# Patient Record
Sex: MALE | Race: WHITE | Employment: OTHER | URBAN - METROPOLITAN AREA
[De-identification: names, ages, dates, MRNs, and addresses within clinical notes are randomized per-mention and may not be internally consistent; named-entity substitution may affect disease eponyms.]

---

## 2018-05-07 ENCOUNTER — OFFICE VISIT (OUTPATIENT)
Dept: OTOLARYNGOLOGY | Facility: CLINIC | Age: 63
End: 2018-05-07

## 2018-05-07 ENCOUNTER — OFFICE VISIT (OUTPATIENT)
Dept: AUDIOLOGY | Facility: CLINIC | Age: 63
End: 2018-05-07

## 2018-05-07 VITALS
DIASTOLIC BLOOD PRESSURE: 76 MMHG | HEIGHT: 77 IN | BODY MASS INDEX: 29.64 KG/M2 | TEMPERATURE: 99 F | SYSTOLIC BLOOD PRESSURE: 116 MMHG | WEIGHT: 251 LBS

## 2018-05-07 DIAGNOSIS — R42 DIZZINESS: Primary | ICD-10-CM

## 2018-05-07 PROCEDURE — 99243 OFF/OP CNSLTJ NEW/EST LOW 30: CPT | Performed by: OTOLARYNGOLOGY

## 2018-05-07 PROCEDURE — 92557 COMPREHENSIVE HEARING TEST: CPT | Performed by: AUDIOLOGIST

## 2018-05-07 PROCEDURE — 92567 TYMPANOMETRY: CPT | Performed by: AUDIOLOGIST

## 2018-05-07 PROCEDURE — 99212 OFFICE O/P EST SF 10 MIN: CPT | Performed by: OTOLARYNGOLOGY

## 2018-05-07 RX ORDER — GABAPENTIN 300 MG/1
300 CAPSULE ORAL 3 TIMES DAILY
COMMUNITY
End: 2018-11-01

## 2018-05-07 RX ORDER — DOXYCYCLINE 50 MG/1
TABLET ORAL
COMMUNITY
Start: 2018-05-03 | End: 2019-11-11

## 2018-05-07 RX ORDER — AMOXICILLIN 500 MG
CAPSULE ORAL
COMMUNITY

## 2018-05-07 NOTE — PROGRESS NOTES
AUDIOGRAM     Linell Lesch was referred for testing by Elmer Zepeda V due to dizziness. 10/2/1955  [id number]    Otoscopic Inspection:  Right ear:  No cerumen  Left ear:  No cerumen    Audiometric Test Results:   Audiometric thresholds indicated juan diego

## 2018-05-07 NOTE — PROGRESS NOTES
Nicolas Martinez is a 57year old male.  Patient presents with:  Dizziness: dizziness for 6 weeks  Sinus Problem: sinus congestion, sinus pressure, sinus headache, post nasal drip  for 6 weeks, 1 course of antibiotic and per pt it did not help    HPI:   He h REVIEW OF SYSTEMS:   GENERAL HEALTH: feels well otherwise  GENERAL : denies fever, chills, sweats, weight loss, weight gain  SKIN: denies any unusual skin lesions or rashes  RESPIRATORY: denies shortness of breath with exertion  NEURO: denies headaches

## 2018-05-10 ENCOUNTER — OFFICE VISIT (OUTPATIENT)
Dept: PHYSICAL THERAPY | Age: 63
End: 2018-05-10
Attending: FAMILY MEDICINE
Payer: COMMERCIAL

## 2018-05-10 DIAGNOSIS — R42 DIZZINESS: ICD-10-CM

## 2018-05-10 PROCEDURE — 97162 PT EVAL MOD COMPLEX 30 MIN: CPT | Performed by: PHYSICAL THERAPIST

## 2018-05-10 PROCEDURE — 95992 CANALITH REPOSITIONING PROC: CPT | Performed by: PHYSICAL THERAPIST

## 2018-05-10 NOTE — PROGRESS NOTES
PHYSICAL THERAPY EVALUATION:   Referring Physician: Dr. Nidia Mejía  Diagnosis: Dizziness (R42)      Date of Onset: March 2018 Date of Service: 5/10/2018  Visit # 1  Scheduled Visits 8  Insurance Authorized visits 6 PPO     PATIENT SUMMARY   Robert Carney static/dynamic balance, gait, and functional activities with challenges of head turns limiting community ambulation.   Pt. would benefit from skilled Physical Therapy to address the above impairments to get into/out of bed and walk and turn head without dif Canalith Repositioning Maneuver, Eye/head coordination exercises, Sensory organization training, Optokinetic stimulation, Strengthening, ROM, Exertion training; Gait Training; Manual Therapy;     Education or treatment limitation: None  Rehab Potential: go

## 2018-05-18 ENCOUNTER — OFFICE VISIT (OUTPATIENT)
Dept: PHYSICAL THERAPY | Age: 63
End: 2018-05-18
Attending: FAMILY MEDICINE
Payer: COMMERCIAL

## 2018-05-18 PROCEDURE — 95992 CANALITH REPOSITIONING PROC: CPT | Performed by: PHYSICAL THERAPIST

## 2018-05-18 NOTE — PROGRESS NOTES
Dx: Dizziness (R42)             Visit # 2  Fall Risk: standard     Scheduled Visits 8  Precautions: n/a   Insurance Authorized visits  6 PPO        Next MD visit: none scheduled  Evaluation Date: 5/10/18  Medication Changes since last visit?: No  Subjectiv

## 2018-05-24 ENCOUNTER — OFFICE VISIT (OUTPATIENT)
Dept: PHYSICAL THERAPY | Age: 63
End: 2018-05-24
Attending: FAMILY MEDICINE
Payer: COMMERCIAL

## 2018-05-24 PROCEDURE — 95992 CANALITH REPOSITIONING PROC: CPT | Performed by: PHYSICAL THERAPIST

## 2018-05-24 NOTE — PROGRESS NOTES
Dx: Dizziness (R42)             Visit # 3  Fall Risk: standard     Scheduled Visits 8  Precautions: n/a   Insurance Authorized visits  6 PPO        Next MD visit: none scheduled  Evaluation Date: 5/10/18  Medication Changes since last visit?: No  Subjectiv

## 2018-05-29 ENCOUNTER — OFFICE VISIT (OUTPATIENT)
Dept: PHYSICAL THERAPY | Age: 63
End: 2018-05-29
Attending: FAMILY MEDICINE
Payer: COMMERCIAL

## 2018-05-29 PROCEDURE — 95992 CANALITH REPOSITIONING PROC: CPT | Performed by: PHYSICAL THERAPIST

## 2018-05-29 NOTE — PROGRESS NOTES
Dx: Dizziness (R42)             Visit # 4  Fall Risk: standard     Scheduled Visits 8  Precautions: n/a   Insurance Authorized visits  6 PPO        Next MD visit: none scheduled  Evaluation Date: 5/10/18  Medication Changes since last visit?: No  Subjectiv

## 2018-06-05 ENCOUNTER — OFFICE VISIT (OUTPATIENT)
Dept: PHYSICAL THERAPY | Age: 63
End: 2018-06-05
Attending: OTOLARYNGOLOGY
Payer: COMMERCIAL

## 2018-06-05 PROCEDURE — 95992 CANALITH REPOSITIONING PROC: CPT | Performed by: PHYSICAL THERAPIST

## 2018-06-05 NOTE — PROGRESS NOTES
Dx: Dizziness (R42)             Visit # 5  Fall Risk: standard     Scheduled Visits 8  Precautions: n/a   Insurance Authorized visits  6 PPO        Next MD visit: none scheduled  Evaluation Date: 5/10/18  Medication Changes since last visit?: No  Subjectiv

## 2018-06-19 ENCOUNTER — OFFICE VISIT (OUTPATIENT)
Dept: PHYSICAL THERAPY | Age: 63
End: 2018-06-19
Attending: OTOLARYNGOLOGY
Payer: COMMERCIAL

## 2018-06-19 PROCEDURE — 97112 NEUROMUSCULAR REEDUCATION: CPT | Performed by: PHYSICAL THERAPIST

## 2018-06-19 NOTE — PROGRESS NOTES
Dx: Dizziness (R42)             Visit # 6  Fall Risk: standard     Scheduled Visits 8  Precautions: n/a   Insurance Authorized visits  6 PPO        Next MD visit: none scheduled  Evaluation Date: 5/10/18  Medication Changes since last visit?: No  Subjectiv

## 2018-06-26 ENCOUNTER — APPOINTMENT (OUTPATIENT)
Dept: PHYSICAL THERAPY | Age: 63
End: 2018-06-26
Attending: OTOLARYNGOLOGY
Payer: COMMERCIAL

## 2018-07-17 NOTE — PROGRESS NOTES
Patient Name: Darcy Diana, : 10/2/1955, MRN: [id number]   Date:  2018  Referring Physician:  Alba Guzmán V    Diagnosis: Dizziness (Lew Hove)    Discharge note    Pt has attended 6 visits in Physical Therapy.      Progress Note Start Date: 5/10/18

## 2019-12-03 ENCOUNTER — HOSPITAL ENCOUNTER (OUTPATIENT)
Dept: CT IMAGING | Age: 64
Discharge: HOME | End: 2019-12-03
Attending: FAMILY MEDICINE

## 2019-12-03 DIAGNOSIS — Z13.6 SCREENING FOR CARDIOVASCULAR CONDITION: ICD-10-CM

## 2020-01-14 ENCOUNTER — HOSPITAL ENCOUNTER (OUTPATIENT)
Dept: GENERAL RADIOLOGY | Age: 65
Discharge: HOME | End: 2020-01-14
Attending: FAMILY MEDICINE
Payer: MEDICARE

## 2020-01-14 DIAGNOSIS — R06.02 BREATH SHORTNESS: ICD-10-CM

## 2020-01-14 PROCEDURE — 71046 X-RAY EXAM CHEST 2 VIEWS: CPT | Performed by: FAMILY MEDICINE

## 2020-01-20 ENCOUNTER — ORDER TRANSCRIPTION (OUTPATIENT)
Dept: ADMINISTRATIVE | Facility: HOSPITAL | Age: 65
End: 2020-01-20

## 2020-01-20 DIAGNOSIS — R06.02 SOB (SHORTNESS OF BREATH): Primary | ICD-10-CM

## 2020-01-29 ENCOUNTER — HOSPITAL ENCOUNTER (OUTPATIENT)
Dept: RESPIRATORY THERAPY | Facility: HOSPITAL | Age: 65
Discharge: HOME | End: 2020-01-29
Attending: FAMILY MEDICINE
Payer: MEDICARE

## 2020-01-29 DIAGNOSIS — R06.02 SOB (SHORTNESS OF BREATH): ICD-10-CM

## 2020-01-29 PROCEDURE — 94726 PLETHYSMOGRAPHY LUNG VOLUMES: CPT

## 2020-01-29 PROCEDURE — 94060 EVALUATION OF WHEEZING: CPT

## 2020-01-29 PROCEDURE — 94729 DIFFUSING CAPACITY: CPT

## 2020-04-29 ENCOUNTER — LAB ENCOUNTER (OUTPATIENT)
Dept: LAB | Age: 65
End: 2020-04-29
Attending: DERMATOLOGY
Payer: MEDICARE

## 2020-04-29 DIAGNOSIS — C44.91 BCC (BASAL CELL CARCINOMA OF SKIN): Primary | ICD-10-CM

## 2020-04-29 PROCEDURE — 88305 TISSUE EXAM BY PATHOLOGIST: CPT

## 2020-05-21 ENCOUNTER — HOSPITAL ENCOUNTER (OUTPATIENT)
Dept: CV DIAGNOSTICS | Facility: HOSPITAL | Age: 65
Discharge: HOME | End: 2020-05-21
Attending: FAMILY MEDICINE
Payer: MEDICARE

## 2020-05-21 DIAGNOSIS — Q21.1: ICD-10-CM

## 2020-05-21 PROCEDURE — 93306 TTE W/DOPPLER COMPLETE: CPT | Performed by: FAMILY MEDICINE

## 2020-06-09 ENCOUNTER — TELEPHONE (OUTPATIENT)
Dept: GASTROENTEROLOGY | Facility: CLINIC | Age: 65
End: 2020-06-09

## 2020-06-09 NOTE — TELEPHONE ENCOUNTER
Patient dropped off copies of his medical records for dr Odalys Santiago to review - prior to his Friday 6-12-20 - VIDEO APPT. At 3:30p  - Placed large white envelope in the tray at the [facility] tray.

## 2020-06-10 ENCOUNTER — ORDER TRANSCRIPTION (OUTPATIENT)
Dept: ADMINISTRATIVE | Facility: HOSPITAL | Age: 65
End: 2020-06-10

## 2020-06-10 DIAGNOSIS — Z01.818 OTHER SPECIFIED PRE-OPERATIVE EXAMINATION: Primary | ICD-10-CM

## 2020-06-10 DIAGNOSIS — Z11.59 ENCOUNTER FOR SCREENING FOR OTHER VIRAL DISEASES: ICD-10-CM

## 2020-06-12 ENCOUNTER — TELEPHONE (OUTPATIENT)
Dept: GASTROENTEROLOGY | Facility: CLINIC | Age: 65
End: 2020-06-12

## 2020-06-12 ENCOUNTER — TELEMEDICINE (OUTPATIENT)
Dept: GASTROENTEROLOGY | Facility: CLINIC | Age: 65
End: 2020-06-12

## 2020-06-12 DIAGNOSIS — R10.12 BILATERAL UPPER ABDOMINAL DISCOMFORT: Primary | ICD-10-CM

## 2020-06-12 DIAGNOSIS — R10.11 RUQ DISCOMFORT: Primary | ICD-10-CM

## 2020-06-12 DIAGNOSIS — R10.11 BILATERAL UPPER ABDOMINAL DISCOMFORT: Primary | ICD-10-CM

## 2020-06-12 DIAGNOSIS — R10.13 EPIGASTRIC DISCOMFORT: ICD-10-CM

## 2020-06-12 PROCEDURE — 99204 OFFICE O/P NEW MOD 45 MIN: CPT | Performed by: INTERNAL MEDICINE

## 2020-06-12 RX ORDER — PANTOPRAZOLE SODIUM 40 MG/1
40 TABLET, DELAYED RELEASE ORAL
Qty: 30 TABLET | Refills: 0 | Status: SHIPPED | OUTPATIENT
Start: 2020-06-12 | End: 2020-07-21

## 2020-06-12 NOTE — TELEPHONE ENCOUNTER
Please contact the patient to schedule an EGD for upper abdominal discomfort with MAC. Can this be performed on Thursday 6/25 either before office in the morning or after office in the afternoon?

## 2020-06-12 NOTE — PROGRESS NOTES
[facility] Amb Video Visit    The patient verbally consents to an ambulatory video visit and understands and accepts financial responsibility for any deductible, co-insurance and/or co-pays associated with this service.     This visit is conducted using Tele[employer] dysphagia, odynophagia or heartburn. He has no other abdominal pain. His bowel movements are regular on Metamucil. He has noted no bleeding. Past medical history:  1. Hypothyroidism  2. Remote history of TIAs  3.   Recent exercise intolerance with a Oral Tab Take 10 mg by mouth daily. • DiphenhydrAMINE HCl, Sleep, (SLEEP AID OR) Take 1 tablet by mouth nightly as needed.      • OCUVITE-LUTEIN OR CAPS 1 CAPSULE DAILY     • CENTRUM SILVER OR daily         Allergies:    Bacitracin              HIVES Tobacco Use      Smoking status: Never Smoker      Smokeless tobacco: Never Used    Alcohol use: Yes      Comment: social    Drug use: No         ROS:   Otherwise negative    Physical exam:  General: patient is awake, cooperative, no acute distress  HEENT: Imaging & Referrals:  None     Please note that this visit was completed using two-way, real-time interactive audio and/or video communication.   This has been done in [place] to provide continuity of care in the best interest of the provider-josee

## 2020-06-12 NOTE — PATIENT INSTRUCTIONS
1.  Pantoprazole 40 mg in the morning 30 minutes before breakfast.  2.  We will contact you to schedule an upper endoscopy to investigate your symptoms.

## 2020-06-16 ENCOUNTER — LAB ENCOUNTER (OUTPATIENT)
Dept: LAB | Facility: HOSPITAL | Age: 65
End: 2020-06-16
Attending: FAMILY MEDICINE
Payer: MEDICARE

## 2020-06-16 DIAGNOSIS — Z11.59 ENCOUNTER FOR SCREENING FOR OTHER VIRAL DISEASES: ICD-10-CM

## 2020-06-16 DIAGNOSIS — Z01.818 OTHER SPECIFIED PRE-OPERATIVE EXAMINATION: ICD-10-CM

## 2020-06-19 ENCOUNTER — HOSPITAL ENCOUNTER (OUTPATIENT)
Dept: CV DIAGNOSTICS | Facility: HOSPITAL | Age: 65
Discharge: HOME | End: 2020-06-19
Attending: FAMILY MEDICINE
Payer: MEDICARE

## 2020-06-19 DIAGNOSIS — R06.02 SHORTNESS OF BREATH: ICD-10-CM

## 2020-06-19 PROCEDURE — 93017 CV STRESS TEST TRACING ONLY: CPT | Performed by: FAMILY MEDICINE

## 2020-06-19 PROCEDURE — 93016 CV STRESS TEST SUPVJ ONLY: CPT | Performed by: FAMILY MEDICINE

## 2020-06-19 PROCEDURE — 93018 CV STRESS TEST I&R ONLY: CPT | Performed by: FAMILY MEDICINE

## 2020-06-30 ENCOUNTER — PATIENT MESSAGE (OUTPATIENT)
Dept: GASTROENTEROLOGY | Facility: CLINIC | Age: 65
End: 2020-06-30

## 2020-06-30 NOTE — TELEPHONE ENCOUNTER
Dr. Chacko Lo-    Patient was not scheduled for EGD on preferred date mentioned below. Please advise on a new date. I will follow- up with scheduling. Thank You.

## 2020-06-30 NOTE — TELEPHONE ENCOUNTER
From: Anni Jauregui  To: Katina Brewer MD  Sent: 6/30/2020 9:51 AM CDT  Subject: Visit Follow-up Question    Good Morning,    I had a video appointment with Dr. Jc Campbell on June 12.  He recommended that I have an endoscopy and that I would re

## 2020-07-01 NOTE — TELEPHONE ENCOUNTER
Please assist patient in scheduling EGD on Saturday, 7/11/2020 at 1130 per message below.  Thank you

## 2020-07-01 NOTE — TELEPHONE ENCOUNTER
Scheduled for:  EGD 37598  Provider Name:  Dr. Brook Miller  Date:  7/11/20  Location:    [facility]  Sedation:  MAC  Time:  8660 (pt is aware to arrive at 1030)   Prep:  NPO after midnight, sent via Mychart  Meds/Allergies Reconciled?:  Physician reviewed   Diag

## 2020-07-03 ENCOUNTER — TELEPHONE (OUTPATIENT)
Dept: GASTROENTEROLOGY | Facility: CLINIC | Age: 65
End: 2020-07-03

## 2020-07-03 NOTE — TELEPHONE ENCOUNTER
GI RN's: Please inform the patient that I received a copy of his CT scan on a CD-ROM. The images are already available and reviewable in our computer. Does the patient want the disc given to him or should we discard it?

## 2020-07-03 NOTE — TELEPHONE ENCOUNTER
Dr. May Quispe-    The patient would like to  the CD. I have already placed it at the  for him to  on Monday. Thank You.

## 2020-07-06 ENCOUNTER — PATIENT MESSAGE (OUTPATIENT)
Dept: GASTROENTEROLOGY | Facility: CLINIC | Age: 65
End: 2020-07-06

## 2020-07-06 NOTE — TELEPHONE ENCOUNTER
From: Rhiannon Tierney  To: Vandana Lebron MD  Sent: 7/6/2020 10:43 AM CDT  Subject: Non-Urgent Medical Question    Good Morning,    I know that I'm not supposed to take my supplements for 7 days preceding my endoscopy.  My question is if that includ

## 2020-07-07 ENCOUNTER — PATIENT MESSAGE (OUTPATIENT)
Dept: GASTROENTEROLOGY | Facility: CLINIC | Age: 65
End: 2020-07-07

## 2020-07-08 NOTE — TELEPHONE ENCOUNTER
From: Juanito Dela Cruz  To: Artem Rodriguez MD  Sent: 7/7/2020 6:58 PM CDT  Subject: Non-Urgent Medical Question    Good Evening,    I was told last week that I might need to take a Covid 19 test prior to my endoscopy on July 11 and that someone woul

## 2020-07-09 ENCOUNTER — LAB ENCOUNTER (OUTPATIENT)
Dept: LAB | Facility: HOSPITAL | Age: 65
End: 2020-07-09
Attending: INTERNAL MEDICINE
Payer: MEDICARE

## 2020-07-09 DIAGNOSIS — Z01.818 PRE-OP TESTING: ICD-10-CM

## 2020-07-10 LAB — SARS-COV-2 RNA RESP QL NAA+PROBE: NOT DETECTED

## 2020-07-11 ENCOUNTER — HOSPITAL ENCOUNTER (OUTPATIENT)
Facility: HOSPITAL | Age: 65
Setting detail: HOSPITAL OUTPATIENT SURGERY
Discharge: HOME | End: 2020-07-11
Attending: INTERNAL MEDICINE | Admitting: INTERNAL MEDICINE
Payer: MEDICARE

## 2020-07-11 ENCOUNTER — ANESTHESIA (OUTPATIENT)
Dept: ENDOSCOPY | Facility: HOSPITAL | Age: 65
End: 2020-07-11
Payer: MEDICARE

## 2020-07-11 ENCOUNTER — ANESTHESIA EVENT (OUTPATIENT)
Dept: ENDOSCOPY | Facility: HOSPITAL | Age: 65
End: 2020-07-11
Payer: MEDICARE

## 2020-07-11 VITALS
WEIGHT: 254 LBS | HEIGHT: 76 IN | SYSTOLIC BLOOD PRESSURE: 111 MMHG | RESPIRATION RATE: 16 BRPM | BODY MASS INDEX: 30.93 KG/M2 | DIASTOLIC BLOOD PRESSURE: 84 MMHG | OXYGEN SATURATION: 95 % | HEART RATE: 82 BPM | TEMPERATURE: 98 F

## 2020-07-11 DIAGNOSIS — R10.12 BILATERAL UPPER ABDOMINAL DISCOMFORT: ICD-10-CM

## 2020-07-11 DIAGNOSIS — Z01.818 PRE-OP TESTING: Primary | ICD-10-CM

## 2020-07-11 DIAGNOSIS — R10.11 BILATERAL UPPER ABDOMINAL DISCOMFORT: ICD-10-CM

## 2020-07-11 PROCEDURE — 0DB78ZX EXCISION OF STOMACH, PYLORUS, VIA NATURAL OR ARTIFICIAL OPENING ENDOSCOPIC, DIAGNOSTIC: ICD-10-PCS | Performed by: INTERNAL MEDICINE

## 2020-07-11 PROCEDURE — 88305 TISSUE EXAM BY PATHOLOGIST: CPT | Performed by: INTERNAL MEDICINE

## 2020-07-11 PROCEDURE — 0DB68ZX EXCISION OF STOMACH, VIA NATURAL OR ARTIFICIAL OPENING ENDOSCOPIC, DIAGNOSTIC: ICD-10-PCS | Performed by: INTERNAL MEDICINE

## 2020-07-11 PROCEDURE — 88312 SPECIAL STAINS GROUP 1: CPT | Performed by: INTERNAL MEDICINE

## 2020-07-11 PROCEDURE — 0DB98ZX EXCISION OF DUODENUM, VIA NATURAL OR ARTIFICIAL OPENING ENDOSCOPIC, DIAGNOSTIC: ICD-10-PCS | Performed by: INTERNAL MEDICINE

## 2020-07-11 RX ORDER — LIDOCAINE HYDROCHLORIDE 10 MG/ML
INJECTION, SOLUTION EPIDURAL; INFILTRATION; INTRACAUDAL; PERINEURAL AS NEEDED
Status: DISCONTINUED | OUTPATIENT
Start: 2020-07-11 | End: 2020-07-11 | Stop reason: SURG

## 2020-07-11 RX ORDER — SODIUM CHLORIDE, SODIUM LACTATE, POTASSIUM CHLORIDE, CALCIUM CHLORIDE 600; 310; 30; 20 MG/100ML; MG/100ML; MG/100ML; MG/100ML
INJECTION, SOLUTION INTRAVENOUS CONTINUOUS
Status: CANCELLED | OUTPATIENT
Start: 2020-07-11

## 2020-07-11 RX ORDER — NALOXONE HYDROCHLORIDE 0.4 MG/ML
80 INJECTION, SOLUTION INTRAMUSCULAR; INTRAVENOUS; SUBCUTANEOUS AS NEEDED
Status: CANCELLED | OUTPATIENT
Start: 2020-07-11 | End: 2020-07-11

## 2020-07-11 RX ORDER — SODIUM CHLORIDE, SODIUM LACTATE, POTASSIUM CHLORIDE, CALCIUM CHLORIDE 600; 310; 30; 20 MG/100ML; MG/100ML; MG/100ML; MG/100ML
INJECTION, SOLUTION INTRAVENOUS CONTINUOUS
Status: DISCONTINUED | OUTPATIENT
Start: 2020-07-11 | End: 2020-07-11

## 2020-07-11 RX ADMIN — SODIUM CHLORIDE, SODIUM LACTATE, POTASSIUM CHLORIDE, CALCIUM CHLORIDE: 600; 310; 30; 20 INJECTION, SOLUTION INTRAVENOUS at 11:59:00

## 2020-07-11 RX ADMIN — LIDOCAINE HYDROCHLORIDE 50 MG: 10 INJECTION, SOLUTION EPIDURAL; INFILTRATION; INTRACAUDAL; PERINEURAL at 12:01:00

## 2020-07-11 RX ADMIN — SODIUM CHLORIDE, SODIUM LACTATE, POTASSIUM CHLORIDE, CALCIUM CHLORIDE: 600; 310; 30; 20 INJECTION, SOLUTION INTRAVENOUS at 12:17:00

## 2020-07-11 NOTE — ANESTHESIA PREPROCEDURE EVALUATION
Anesthesia PreOp Note    HPI:     Aleisha Segura is a 75year old male who presents for preoperative consultation requested by: Gabriela Solomon MD    Date of Surgery: 7/11/2020    Procedure(s):  ESOPHAGOGASTRODUODENOSCOPY (EGD)  Indication: Bilate INTRAOCULAR LENS IMPLANT 64548 Right 8/14/2019    Performed by Bertrand Chau MD at [facility]   • SKIN SURGERY  11/01/2018    Exc of [address] to right medial chest   • SKIN SURGERY  05/28/2020    BCC to Left Chest WLE by AP    • TOTAL KNEE REPLA 1049    No current [facility]-ordered outpatient medications on file.         Bacitracin              HIVES    Family History   Problem Relation Age of Onset   • Cancer Father         larynx, lung in 80   • Heart Disorder Father         aortic annyerysm   • Other mass index is 30.92 kg/m². height is 1.93 m (6' 4\") and weight is 115.2 kg (254 lb). His oral temperature is 97.9 °F (36.6 °C). His blood pressure is 133/96 (abnormal) and his pulse is 80. His respiration is 14 and oxygen saturation is 94%.     07/08/20

## 2020-07-11 NOTE — OPERATIVE REPORT
[facility] Endoscopy Report      Date of Procedure:  07/11/20        Preoperative Diagnosis:  Right upper quadrant and right epigastric abdominal discomfort      Postoperative Diagnosis:  1. Tiny gastric antral erosion  2.   Small hiatal he described erosion and from the gastric body and placed in separate containers. The duodenal bulb and post bulbar regions were normal.  Biopsies from the duodenum were obtained. Impression:  1. Tiny gastric antral erosion  2.   Small hiatal hernia

## 2020-07-11 NOTE — H&P
History & Physical Examination    Patient Name: Carol Morataya  MRN: [id number]  CSN: [id number]  YOB: 1955    Diagnosis: Right upper quadrant/right epigastric abdominal pain      Pantoprazole Sodium 40 MG Oral Tab EC, Take 1 tablet (40 mg Cancer [facility])     SKIN    • Disorder of thyroid    • High cholesterol    • History of blood transfusion     1973   • PONV (postoperative nausea and vomiting)    • Spinal stenosis    • Stroke [facility])     TIA 2012 2013   • TIA (transient ischemic attack)      P X]    HEART Ewelina.Righter ] [ X]    LUNGS Ewelina.Righter ] [ Dayday Cortez Ewelina.Righter ] [ Mauro Carrillo Ewelina.Righter ] [ Eric Sell        [ x ] I have discussed the risks and benefits and alternatives with the patient/family. They understand and agree to proceed with plan of care.   [ x ] I

## 2020-07-11 NOTE — ANESTHESIA POSTPROCEDURE EVALUATION
Patient: Kyle Monge    Procedure Summary     Date:  07/11/20 Room / Location:  [facility] ENDOSCOPY 01 / [facility] ENDOSCOPY    Anesthesia Start:  8374 Anesthesia Stop:  1218    Procedure:  ESOPHAGOGASTRODUODENOSCOPY (EGD) (N/A ) Diagnosis:       Bilateral upper

## 2020-07-21 DIAGNOSIS — R10.11 RUQ PAIN: Primary | ICD-10-CM

## 2020-07-21 RX ORDER — PANTOPRAZOLE SODIUM 40 MG/1
40 TABLET, DELAYED RELEASE ORAL
Qty: 90 TABLET | Refills: 3 | Status: SHIPPED | OUTPATIENT
Start: 2020-07-21

## 2020-07-24 PROBLEM — I35.0 NONRHEUMATIC AORTIC VALVE STENOSIS: Status: ACTIVE | Noted: 2020-07-24

## 2020-08-06 ENCOUNTER — APPOINTMENT (OUTPATIENT)
Dept: LAB | Age: 65
End: 2020-08-06
Attending: INTERNAL MEDICINE
Payer: MEDICARE

## 2020-08-06 DIAGNOSIS — R10.11 RUQ PAIN: ICD-10-CM

## 2020-08-06 LAB
ALBUMIN SERPL-MCNC: 3.5 G/DL (ref 3.4–5)
ALP LIVER SERPL-CCNC: 68 U/L (ref 45–117)
ALT SERPL-CCNC: 47 U/L (ref 16–61)
AST SERPL-CCNC: 34 U/L (ref 15–37)
BILIRUB DIRECT SERPL-MCNC: 0.1 MG/DL (ref 0–0.2)
BILIRUB SERPL-MCNC: 0.5 MG/DL (ref 0.1–2)
LIPASE SERPL-CCNC: 270 U/L (ref 73–393)
M PROTEIN MFR SERPL ELPH: 7.1 G/DL (ref 6.4–8.2)

## 2020-08-06 PROCEDURE — 36415 COLL VENOUS BLD VENIPUNCTURE: CPT

## 2020-08-06 PROCEDURE — 83690 ASSAY OF LIPASE: CPT

## 2020-08-06 PROCEDURE — 80076 HEPATIC FUNCTION PANEL: CPT

## 2020-08-07 ENCOUNTER — PATIENT MESSAGE (OUTPATIENT)
Dept: GASTROENTEROLOGY | Facility: CLINIC | Age: 65
End: 2020-08-07

## 2020-08-07 NOTE — TELEPHONE ENCOUNTER
Dr. Stacie Burnham-    Please see patient message below. I have sent him a message with food triggers to avoid. Thank You.

## 2020-08-07 NOTE — TELEPHONE ENCOUNTER
From: Juanito Dela Cruz  To: Artem Rodriguez MD  Sent: 8/7/2020 3:25 PM CDT  Subject: Test Results Question    Good Afternoon Dr. Pk Prieto,    I went for a blood test on Thursday morning, 8/6, as I had a significant episode the night before during

## 2020-08-08 NOTE — TELEPHONE ENCOUNTER
I spoke to Celia. He had an episode Wednesday evening of right upper quadrant abdominal pain that lasted for 3 hours and then abated. This coincided with the eating at [address] and drinking a small amount of wine.   He had a similar but milder episode t

## 2020-10-06 NOTE — TELEPHONE ENCOUNTER
"Sinus congestion/allergies x "a couple weeks ago", facial pain near eyes and around cheek bones. VV scheduled for 10/7/2020, patient aware  " Please advise on refill request below. Thank you. LV telemedicine visit on 06/12/2020 with Dr. Ailyn Haddad    LR 06/12/20 #30 with 0 refills    No future f/u appointment noted in system. Per 06/12/2020 telemedicine visit-  .  RUQ discomfort  The

## 2021-03-09 DIAGNOSIS — Z23 NEED FOR VACCINATION: ICD-10-CM

## 2021-10-04 ENCOUNTER — LAB REQUISITION (OUTPATIENT)
Dept: LAB | Age: 66
End: 2021-10-04

## 2021-10-04 ENCOUNTER — LAB SERVICES (OUTPATIENT)
Dept: LAB | Age: 66
End: 2021-10-04

## 2021-10-04 DIAGNOSIS — E03.9 HYPOTHYROIDISM, UNSPECIFIED: ICD-10-CM

## 2021-10-04 DIAGNOSIS — E78.5 HYPERLIPIDEMIA, UNSPECIFIED: ICD-10-CM

## 2021-10-04 DIAGNOSIS — Z12.5 ENCOUNTER FOR SCREENING FOR MALIGNANT NEOPLASM OF PROSTATE: ICD-10-CM

## 2021-10-04 LAB
ALBUMIN SERPL-MCNC: 3.9 G/DL (ref 3.6–5.1)
ALBUMIN/GLOB SERPL: 1.1 {RATIO} (ref 1–2.4)
ALP SERPL-CCNC: 76 UNITS/L (ref 45–117)
ALT SERPL-CCNC: 37 UNITS/L
ANION GAP SERPL CALC-SCNC: 11 MMOL/L (ref 10–20)
AST SERPL-CCNC: 24 UNITS/L
BILIRUB SERPL-MCNC: 1.2 MG/DL (ref 0.2–1)
BUN SERPL-MCNC: 17 MG/DL (ref 6–20)
BUN/CREAT SERPL: 13 (ref 7–25)
CALCIUM SERPL-MCNC: 9.4 MG/DL (ref 8.4–10.2)
CHLORIDE SERPL-SCNC: 106 MMOL/L (ref 98–107)
CHOLEST SERPL-MCNC: 166 MG/DL
CHOLEST/HDLC SERPL: 4.2 {RATIO}
CO2 SERPL-SCNC: 27 MMOL/L (ref 21–32)
CREAT SERPL-MCNC: 1.35 MG/DL (ref 0.67–1.17)
FASTING DURATION TIME PATIENT: 12 HOURS (ref 0–999)
FASTING DURATION TIME PATIENT: 12 HOURS (ref 0–999)
GFR SERPLBLD BASED ON 1.73 SQ M-ARVRAT: 54 ML/MIN
GLOBULIN SER-MCNC: 3.6 G/DL (ref 2–4)
GLUCOSE SERPL-MCNC: 89 MG/DL (ref 70–99)
HDLC SERPL-MCNC: 40 MG/DL
LDLC SERPL CALC-MCNC: 96 MG/DL
NONHDLC SERPL-MCNC: 126 MG/DL
POTASSIUM SERPL-SCNC: 4.5 MMOL/L (ref 3.4–5.1)
PROT SERPL-MCNC: 7.5 G/DL (ref 6.4–8.2)
PSA SERPL-MCNC: 0.75 NG/ML
SODIUM SERPL-SCNC: 139 MMOL/L (ref 135–145)
TRIGL SERPL-MCNC: 150 MG/DL
TSH SERPL-ACNC: 2.56 MCUNITS/ML (ref 0.35–5)

## 2021-10-04 PROCEDURE — 80053 COMPREHEN METABOLIC PANEL: CPT | Performed by: CLINICAL MEDICAL LABORATORY

## 2021-10-04 PROCEDURE — 84443 ASSAY THYROID STIM HORMONE: CPT | Performed by: CLINICAL MEDICAL LABORATORY

## 2021-10-04 PROCEDURE — 80061 LIPID PANEL: CPT | Performed by: CLINICAL MEDICAL LABORATORY

## 2021-10-04 PROCEDURE — G0103 PSA SCREENING: HCPCS | Performed by: CLINICAL MEDICAL LABORATORY

## 2022-10-19 ENCOUNTER — LAB SERVICES (OUTPATIENT)
Dept: LAB | Age: 67
End: 2022-10-19

## 2022-10-19 ENCOUNTER — LAB REQUISITION (OUTPATIENT)
Dept: LAB | Age: 67
End: 2022-10-19

## 2022-10-19 DIAGNOSIS — E03.9 HYPOTHYROIDISM, UNSPECIFIED: ICD-10-CM

## 2022-10-19 DIAGNOSIS — Z12.5 ENCOUNTER FOR SCREENING FOR MALIGNANT NEOPLASM OF PROSTATE: ICD-10-CM

## 2022-10-19 DIAGNOSIS — N18.31 CHRONIC KIDNEY DISEASE, STAGE 3A (CMD): ICD-10-CM

## 2022-10-19 DIAGNOSIS — E78.5 HYPERLIPIDEMIA, UNSPECIFIED: ICD-10-CM

## 2022-10-19 LAB
ALBUMIN SERPL-MCNC: 4 G/DL (ref 3.6–5.1)
ALBUMIN/GLOB SERPL: 1.2 {RATIO} (ref 1–2.4)
ALP SERPL-CCNC: 84 UNITS/L (ref 45–117)
ALT SERPL-CCNC: 34 UNITS/L
ANION GAP SERPL CALC-SCNC: 14 MMOL/L (ref 7–19)
APPEARANCE UR: CLEAR
AST SERPL-CCNC: 25 UNITS/L
BILIRUB SERPL-MCNC: 1.4 MG/DL (ref 0.2–1)
BILIRUB UR QL STRIP: NEGATIVE
BUN SERPL-MCNC: 18 MG/DL (ref 6–20)
BUN/CREAT SERPL: 16 (ref 7–25)
CALCIUM SERPL-MCNC: 9.6 MG/DL (ref 8.4–10.2)
CHLORIDE SERPL-SCNC: 106 MMOL/L (ref 97–110)
CHOLEST SERPL-MCNC: 184 MG/DL
CHOLEST/HDLC SERPL: 5.3 {RATIO}
CO2 SERPL-SCNC: 23 MMOL/L (ref 21–32)
COLOR UR: YELLOW
CREAT SERPL-MCNC: 1.14 MG/DL (ref 0.67–1.17)
FASTING DURATION TIME PATIENT: 12 HOURS (ref 0–999)
FASTING DURATION TIME PATIENT: 12 HOURS (ref 0–999)
GFR SERPLBLD BASED ON 1.73 SQ M-ARVRAT: 70 ML/MIN
GLOBULIN SER-MCNC: 3.3 G/DL (ref 2–4)
GLUCOSE SERPL-MCNC: 92 MG/DL (ref 70–99)
GLUCOSE UR STRIP-MCNC: NEGATIVE MG/DL
HDLC SERPL-MCNC: 35 MG/DL
HGB UR QL STRIP: NEGATIVE
KETONES UR STRIP-MCNC: NEGATIVE MG/DL
LDLC SERPL CALC-MCNC: 111 MG/DL
LEUKOCYTE ESTERASE UR QL STRIP: NEGATIVE
NITRITE UR QL STRIP: NEGATIVE
NONHDLC SERPL-MCNC: 149 MG/DL
PH UR STRIP: 5 [PH] (ref 5–7)
POTASSIUM SERPL-SCNC: 3.9 MMOL/L (ref 3.4–5.1)
PROT SERPL-MCNC: 7.3 G/DL (ref 6.4–8.2)
PROT UR STRIP-MCNC: NEGATIVE MG/DL
PSA SERPL-MCNC: 0.78 NG/ML
SODIUM SERPL-SCNC: 139 MMOL/L (ref 135–145)
SP GR UR STRIP: 1.02 (ref 1–1.03)
T4 FREE SERPL-MCNC: 1.1 NG/DL (ref 0.8–1.5)
TRIGL SERPL-MCNC: 190 MG/DL
TSH SERPL-ACNC: 6.08 MCUNITS/ML (ref 0.35–5)
UROBILINOGEN UR STRIP-MCNC: 0.2 MG/DL

## 2022-10-19 PROCEDURE — G0103 PSA SCREENING: HCPCS | Performed by: CLINICAL MEDICAL LABORATORY

## 2022-10-19 PROCEDURE — 84439 ASSAY OF FREE THYROXINE: CPT | Performed by: CLINICAL MEDICAL LABORATORY

## 2022-10-19 PROCEDURE — 81003 URINALYSIS AUTO W/O SCOPE: CPT | Performed by: CLINICAL MEDICAL LABORATORY

## 2022-10-19 PROCEDURE — 80053 COMPREHEN METABOLIC PANEL: CPT | Performed by: CLINICAL MEDICAL LABORATORY

## 2022-10-19 PROCEDURE — 84443 ASSAY THYROID STIM HORMONE: CPT | Performed by: CLINICAL MEDICAL LABORATORY

## 2022-10-19 PROCEDURE — 80061 LIPID PANEL: CPT | Performed by: CLINICAL MEDICAL LABORATORY

## 2022-10-19 PROCEDURE — 36415 COLL VENOUS BLD VENIPUNCTURE: CPT | Performed by: CLINICAL MEDICAL LABORATORY

## 2022-10-20 ENCOUNTER — LAB REQUISITION (OUTPATIENT)
Dept: LAB | Age: 67
End: 2022-10-20

## 2022-10-20 DIAGNOSIS — E78.5 HYPERLIPIDEMIA, UNSPECIFIED: ICD-10-CM

## 2022-10-20 DIAGNOSIS — E03.9 HYPOTHYROIDISM, UNSPECIFIED: ICD-10-CM

## 2022-12-28 ENCOUNTER — TELEPHONE (OUTPATIENT)
Dept: OTHER | Age: 67
End: 2022-12-28

## 2023-01-23 ENCOUNTER — TELEPHONE (OUTPATIENT)
Dept: OTHER | Age: 68
End: 2023-01-23

## 2023-02-21 ENCOUNTER — LAB SERVICES (OUTPATIENT)
Dept: LAB | Age: 68
End: 2023-02-21

## 2023-02-21 ENCOUNTER — LAB REQUISITION (OUTPATIENT)
Dept: LAB | Age: 68
End: 2023-02-21

## 2023-02-21 DIAGNOSIS — E03.9 HYPOTHYROIDISM, UNSPECIFIED: ICD-10-CM

## 2023-02-21 DIAGNOSIS — E78.1 PURE HYPERGLYCERIDEMIA: ICD-10-CM

## 2023-02-21 LAB
ALBUMIN SERPL-MCNC: 4 G/DL (ref 3.6–5.1)
ALBUMIN/GLOB SERPL: 1.2 {RATIO} (ref 1–2.4)
ALP SERPL-CCNC: 75 UNITS/L (ref 45–117)
ALT SERPL-CCNC: 31 UNITS/L
ANION GAP SERPL CALC-SCNC: 11 MMOL/L (ref 7–19)
AST SERPL-CCNC: 28 UNITS/L
BILIRUB SERPL-MCNC: 1.5 MG/DL (ref 0.2–1)
BUN SERPL-MCNC: 23 MG/DL (ref 6–20)
BUN/CREAT SERPL: 17 (ref 7–25)
CALCIUM SERPL-MCNC: 9.3 MG/DL (ref 8.4–10.2)
CHLORIDE SERPL-SCNC: 106 MMOL/L (ref 97–110)
CHOLEST SERPL-MCNC: 174 MG/DL
CHOLEST/HDLC SERPL: 4.7 {RATIO}
CO2 SERPL-SCNC: 24 MMOL/L (ref 21–32)
CREAT SERPL-MCNC: 1.35 MG/DL (ref 0.67–1.17)
FASTING DURATION TIME PATIENT: 12 HOURS (ref 0–999)
FASTING DURATION TIME PATIENT: 12 HOURS (ref 0–999)
GFR SERPLBLD BASED ON 1.73 SQ M-ARVRAT: 57 ML/MIN
GLOBULIN SER-MCNC: 3.3 G/DL (ref 2–4)
GLUCOSE SERPL-MCNC: 106 MG/DL (ref 70–99)
HDLC SERPL-MCNC: 37 MG/DL
LDLC SERPL CALC-MCNC: 108 MG/DL
NONHDLC SERPL-MCNC: 137 MG/DL
POTASSIUM SERPL-SCNC: 4.3 MMOL/L (ref 3.4–5.1)
PROT SERPL-MCNC: 7.3 G/DL (ref 6.4–8.2)
SODIUM SERPL-SCNC: 137 MMOL/L (ref 135–145)
TRIGL SERPL-MCNC: 146 MG/DL
TSH SERPL-ACNC: 2.12 MCUNITS/ML (ref 0.35–5)

## 2023-02-21 PROCEDURE — 80061 LIPID PANEL: CPT | Performed by: CLINICAL MEDICAL LABORATORY

## 2023-02-21 PROCEDURE — 84443 ASSAY THYROID STIM HORMONE: CPT | Performed by: CLINICAL MEDICAL LABORATORY

## 2023-02-21 PROCEDURE — 80053 COMPREHEN METABOLIC PANEL: CPT | Performed by: CLINICAL MEDICAL LABORATORY

## 2023-02-21 PROCEDURE — 36415 COLL VENOUS BLD VENIPUNCTURE: CPT | Performed by: CLINICAL MEDICAL LABORATORY

## 2023-04-28 ENCOUNTER — HOSPITAL ENCOUNTER (OUTPATIENT)
Age: 68
Discharge: HOME | End: 2023-04-28
Payer: MEDICARE

## 2023-04-28 ENCOUNTER — APPOINTMENT (OUTPATIENT)
Dept: GENERAL RADIOLOGY | Age: 68
End: 2023-04-28
Attending: NURSE PRACTITIONER
Payer: MEDICARE

## 2023-04-28 VITALS
DIASTOLIC BLOOD PRESSURE: 66 MMHG | RESPIRATION RATE: 16 BRPM | OXYGEN SATURATION: 96 % | HEART RATE: 77 BPM | SYSTOLIC BLOOD PRESSURE: 124 MMHG | TEMPERATURE: 97 F

## 2023-04-28 DIAGNOSIS — R07.81 RIB PAIN ON RIGHT SIDE: Primary | ICD-10-CM

## 2023-04-28 PROCEDURE — 71101 X-RAY EXAM UNILAT RIBS/CHEST: CPT | Performed by: NURSE PRACTITIONER

## 2023-04-28 PROCEDURE — 99203 OFFICE O/P NEW LOW 30 MIN: CPT

## 2023-04-28 PROCEDURE — 99204 OFFICE O/P NEW MOD 45 MIN: CPT

## 2023-04-28 RX ORDER — CYCLOBENZAPRINE HCL 10 MG
10 TABLET ORAL 3 TIMES DAILY PRN
Qty: 20 TABLET | Refills: 0 | Status: SHIPPED | OUTPATIENT
Start: 2023-04-28 | End: 2023-05-05

## 2023-04-28 RX ORDER — LIDOCAINE 50 MG/G
1 PATCH TOPICAL EVERY 24 HOURS
Qty: 10 PATCH | Refills: 0 | Status: SHIPPED | OUTPATIENT
Start: 2023-04-28 | End: 2023-05-08

## 2023-04-28 NOTE — DISCHARGE INSTRUCTIONS
Ice to the area of soreness. Take the medications as prescribed. The Flexeril may make you drowsy, no driving while taking. Follow-up with your doctor early next week. For any worsening or concerning symptoms, go to the nearest emergency department for further evaluation.

## 2023-04-28 NOTE — ED INITIAL ASSESSMENT (HPI)
Patient with RLQ pain from workout while doing leg press on Monday, patient states he felt pain moved up to Right lower rib    Patient with some discomfort and able to do normal activities and workouts until today when pain became worse while doing a lying and twisting torso exercise. States pain became unbeatable and discomfort to right lower rib area with a deep breath.

## 2023-08-26 ENCOUNTER — HOSPITAL ENCOUNTER (EMERGENCY)
Facility: HOSPITAL | Age: 68
Discharge: LEFT BEFORE DISCHARGE | End: 2023-08-26
Payer: MEDICARE

## 2023-08-26 VITALS
HEIGHT: 76 IN | HEART RATE: 85 BPM | BODY MASS INDEX: 28.01 KG/M2 | DIASTOLIC BLOOD PRESSURE: 80 MMHG | SYSTOLIC BLOOD PRESSURE: 127 MMHG | WEIGHT: 230 LBS | OXYGEN SATURATION: 96 % | TEMPERATURE: 98 F | RESPIRATION RATE: 19 BRPM

## 2023-08-26 NOTE — ED INITIAL ASSESSMENT (HPI)
Pt presents for bright red blood with bowel movements x2 this morning. Pt denies black or tarry stools. +324 ASA daily use. Pt denies abd pain. Pt denies vomiting.

## 2023-08-31 ENCOUNTER — LAB ENCOUNTER (OUTPATIENT)
Dept: LAB | Age: 68
End: 2023-08-31
Attending: FAMILY MEDICINE
Payer: MEDICARE

## 2023-08-31 DIAGNOSIS — K92.1 BLOOD IN STOOL: Primary | ICD-10-CM

## 2023-08-31 LAB
BASOPHILS # BLD AUTO: 0.07 X10(3) UL (ref 0–0.2)
BASOPHILS NFR BLD AUTO: 0.8 %
DEPRECATED RDW RBC AUTO: 44 FL (ref 35.1–46.3)
EOSINOPHIL # BLD AUTO: 0.36 X10(3) UL (ref 0–0.7)
EOSINOPHIL NFR BLD AUTO: 4.1 %
ERYTHROCYTE [DISTWIDTH] IN BLOOD BY AUTOMATED COUNT: 13 % (ref 11–15)
HCT VFR BLD AUTO: 46.8 %
HGB BLD-MCNC: 15.9 G/DL
IMM GRANULOCYTES # BLD AUTO: 0.04 X10(3) UL (ref 0–1)
IMM GRANULOCYTES NFR BLD: 0.5 %
LYMPHOCYTES # BLD AUTO: 1.53 X10(3) UL (ref 1–4)
LYMPHOCYTES NFR BLD AUTO: 17.5 %
MCH RBC QN AUTO: 31.4 PG (ref 26–34)
MCHC RBC AUTO-ENTMCNC: 34 G/DL (ref 31–37)
MCV RBC AUTO: 92.5 FL
MONOCYTES # BLD AUTO: 0.75 X10(3) UL (ref 0.1–1)
MONOCYTES NFR BLD AUTO: 8.6 %
NEUTROPHILS # BLD AUTO: 5.99 X10 (3) UL (ref 1.5–7.7)
NEUTROPHILS # BLD AUTO: 5.99 X10(3) UL (ref 1.5–7.7)
NEUTROPHILS NFR BLD AUTO: 68.5 %
PLATELET # BLD AUTO: 270 10(3)UL (ref 150–450)
RBC # BLD AUTO: 5.06 X10(6)UL
WBC # BLD AUTO: 8.7 X10(3) UL (ref 4–11)

## 2023-08-31 PROCEDURE — 85025 COMPLETE CBC W/AUTO DIFF WBC: CPT

## 2023-08-31 PROCEDURE — 36415 COLL VENOUS BLD VENIPUNCTURE: CPT

## 2023-10-17 ENCOUNTER — EKG ENCOUNTER (OUTPATIENT)
Dept: LAB | Age: 68
End: 2023-10-17
Attending: FAMILY MEDICINE
Payer: MEDICARE

## 2023-10-17 DIAGNOSIS — I35.0 NODULAR CALCIFIC AORTIC VALVE STENOSIS: ICD-10-CM

## 2023-10-17 DIAGNOSIS — Z01.818 PREOPERATIVE EXAMINATION, UNSPECIFIED: Primary | ICD-10-CM

## 2023-10-17 LAB
ATRIAL RATE: 76 BPM
P AXIS: 42 DEGREES
P-R INTERVAL: 168 MS
Q-T INTERVAL: 398 MS
QRS DURATION: 86 MS
QTC CALCULATION (BEZET): 447 MS
R AXIS: -33 DEGREES
T AXIS: 8 DEGREES
VENTRICULAR RATE: 76 BPM

## 2023-10-17 PROCEDURE — 93005 ELECTROCARDIOGRAM TRACING: CPT

## 2023-10-17 PROCEDURE — 93010 ELECTROCARDIOGRAM REPORT: CPT | Performed by: INTERNAL MEDICINE

## 2023-10-19 DIAGNOSIS — R94.31 ABNORMAL ECG: ICD-10-CM

## 2023-10-19 DIAGNOSIS — I35.0 AORTIC VALVE STENOSIS: Primary | ICD-10-CM

## 2023-10-25 ENCOUNTER — HOSPITAL ENCOUNTER (OUTPATIENT)
Dept: CV DIAGNOSTICS | Facility: HOSPITAL | Age: 68
Discharge: HOME | End: 2023-10-25
Attending: FAMILY MEDICINE

## 2023-10-25 DIAGNOSIS — I35.0 NODULAR CALCIFIC AORTIC VALVE STENOSIS: ICD-10-CM

## 2023-10-25 DIAGNOSIS — Z01.818 PREOP EXAMINATION: ICD-10-CM

## 2023-10-25 PROCEDURE — 93306 TTE W/DOPPLER COMPLETE: CPT | Performed by: FAMILY MEDICINE

## 2023-10-26 ENCOUNTER — LAB SERVICES (OUTPATIENT)
Dept: LAB | Age: 68
End: 2023-10-26

## 2023-10-26 ENCOUNTER — LAB REQUISITION (OUTPATIENT)
Dept: LAB | Age: 68
End: 2023-10-26

## 2023-10-26 DIAGNOSIS — E78.5 HYPERLIPIDEMIA, UNSPECIFIED: ICD-10-CM

## 2023-10-26 DIAGNOSIS — Z12.5 ENCOUNTER FOR SCREENING FOR MALIGNANT NEOPLASM OF PROSTATE: ICD-10-CM

## 2023-10-26 DIAGNOSIS — E03.9 HYPOTHYROIDISM, UNSPECIFIED: ICD-10-CM

## 2023-10-26 PROCEDURE — 84443 ASSAY THYROID STIM HORMONE: CPT | Performed by: CLINICAL MEDICAL LABORATORY

## 2023-10-26 PROCEDURE — 80053 COMPREHEN METABOLIC PANEL: CPT | Performed by: CLINICAL MEDICAL LABORATORY

## 2023-10-26 PROCEDURE — G0103 PSA SCREENING: HCPCS | Performed by: CLINICAL MEDICAL LABORATORY

## 2023-10-26 PROCEDURE — 80061 LIPID PANEL: CPT | Performed by: CLINICAL MEDICAL LABORATORY

## 2023-10-26 PROCEDURE — 84403 ASSAY OF TOTAL TESTOSTERONE: CPT | Performed by: CLINICAL MEDICAL LABORATORY

## 2023-10-27 LAB
ALBUMIN SERPL-MCNC: 3.6 G/DL (ref 3.6–5.1)
ALBUMIN/GLOB SERPL: 1.1 {RATIO} (ref 1–2.4)
ALP SERPL-CCNC: 78 UNITS/L (ref 45–117)
ALT SERPL-CCNC: 26 UNITS/L
ANION GAP SERPL CALC-SCNC: 13 MMOL/L (ref 7–19)
AST SERPL-CCNC: 23 UNITS/L
BILIRUB SERPL-MCNC: 0.9 MG/DL (ref 0.2–1)
BUN SERPL-MCNC: 18 MG/DL (ref 6–20)
BUN/CREAT SERPL: 15 (ref 7–25)
CALCIUM SERPL-MCNC: 9.1 MG/DL (ref 8.4–10.2)
CHLORIDE SERPL-SCNC: 108 MMOL/L (ref 97–110)
CHOLEST SERPL-MCNC: 160 MG/DL
CHOLEST/HDLC SERPL: 3.6 {RATIO}
CO2 SERPL-SCNC: 24 MMOL/L (ref 21–32)
CREAT SERPL-MCNC: 1.21 MG/DL (ref 0.67–1.17)
EGFRCR SERPLBLD CKD-EPI 2021: 64 ML/MIN/{1.73_M2}
FASTING DURATION TIME PATIENT: 12 HOURS (ref 0–999)
GLOBULIN SER-MCNC: 3.2 G/DL (ref 2–4)
GLUCOSE SERPL-MCNC: 103 MG/DL (ref 70–99)
HDLC SERPL-MCNC: 45 MG/DL
LDLC SERPL CALC-MCNC: 90 MG/DL
NONHDLC SERPL-MCNC: 115 MG/DL
POTASSIUM SERPL-SCNC: 3.8 MMOL/L (ref 3.4–5.1)
PROT SERPL-MCNC: 6.8 G/DL (ref 6.4–8.2)
PSA SERPL-MCNC: 0.74 NG/ML
SODIUM SERPL-SCNC: 141 MMOL/L (ref 135–145)
TESTOST SERPL-MCNC: 497.4 NG/DL (ref 280–1100)
TRIGL SERPL-MCNC: 126 MG/DL
TSH SERPL-ACNC: 3.97 MCUNITS/ML (ref 0.35–5)

## 2023-11-08 ENCOUNTER — APPOINTMENT (OUTPATIENT)
Dept: CARDIOLOGY | Age: 68
End: 2023-11-08
Attending: FAMILY MEDICINE

## 2023-12-06 ENCOUNTER — HOSPITAL ENCOUNTER (OUTPATIENT)
Dept: MRI IMAGING | Facility: HOSPITAL | Age: 68
Discharge: HOME | End: 2023-12-06
Attending: INTERNAL MEDICINE
Payer: MEDICARE

## 2023-12-06 ENCOUNTER — LAB ENCOUNTER (OUTPATIENT)
Dept: LAB | Facility: HOSPITAL | Age: 68
End: 2023-12-06
Attending: RADIOLOGY
Payer: MEDICARE

## 2023-12-06 DIAGNOSIS — I35.0 AORTIC VALVE STENOSIS: ICD-10-CM

## 2023-12-06 LAB — HCT VFR BLD AUTO: 46.1 %

## 2023-12-06 PROCEDURE — 75561 CARDIAC MRI FOR MORPH W/DYE: CPT | Performed by: INTERNAL MEDICINE

## 2023-12-06 PROCEDURE — 85014 HEMATOCRIT: CPT | Performed by: RADIOLOGY

## 2023-12-06 PROCEDURE — A9585 GADOBUTROL INJECTION: HCPCS | Performed by: INTERNAL MEDICINE

## 2023-12-15 RX ORDER — CROMOLYN SODIUM 5.2 MG
1 AEROSOL, SPRAY WITH PUMP (ML) NASAL 2 TIMES DAILY
COMMUNITY

## 2023-12-16 ENCOUNTER — LAB ENCOUNTER (OUTPATIENT)
Dept: LAB | Age: 68
End: 2023-12-16
Attending: INTERNAL MEDICINE
Payer: MEDICARE

## 2023-12-16 DIAGNOSIS — Z01.818 PREOP TESTING: ICD-10-CM

## 2023-12-16 LAB
ANION GAP SERPL CALC-SCNC: 7 MMOL/L (ref 0–18)
BUN BLD-MCNC: 15 MG/DL (ref 9–23)
BUN/CREAT SERPL: 12.8 (ref 10–20)
CALCIUM BLD-MCNC: 9.5 MG/DL (ref 8.7–10.4)
CHLORIDE SERPL-SCNC: 106 MMOL/L (ref 98–112)
CO2 SERPL-SCNC: 27 MMOL/L (ref 21–32)
CREAT BLD-MCNC: 1.17 MG/DL
DEPRECATED RDW RBC AUTO: 42.7 FL (ref 35.1–46.3)
EGFRCR SERPLBLD CKD-EPI 2021: 67 ML/MIN/1.73M2 (ref 60–?)
ERYTHROCYTE [DISTWIDTH] IN BLOOD BY AUTOMATED COUNT: 12.7 % (ref 11–15)
FASTING STATUS PATIENT QL REPORTED: YES
GLUCOSE BLD-MCNC: 102 MG/DL (ref 70–99)
HCT VFR BLD AUTO: 45.7 %
HGB BLD-MCNC: 15.2 G/DL
INR BLD: 0.87 (ref 0.8–1.2)
MCH RBC QN AUTO: 30.5 PG (ref 26–34)
MCHC RBC AUTO-ENTMCNC: 33.3 G/DL (ref 31–37)
MCV RBC AUTO: 91.6 FL
OSMOLALITY SERPL CALC.SUM OF ELEC: 291 MOSM/KG (ref 275–295)
PLATELET # BLD AUTO: 323 10(3)UL (ref 150–450)
POTASSIUM SERPL-SCNC: 4.2 MMOL/L (ref 3.5–5.1)
PROTHROMBIN TIME: 12.3 SECONDS (ref 11.6–14.8)
RBC # BLD AUTO: 4.99 X10(6)UL
SODIUM SERPL-SCNC: 140 MMOL/L (ref 136–145)
WBC # BLD AUTO: 6.3 X10(3) UL (ref 4–11)

## 2023-12-16 PROCEDURE — 80048 BASIC METABOLIC PNL TOTAL CA: CPT

## 2023-12-16 PROCEDURE — 85610 PROTHROMBIN TIME: CPT

## 2023-12-16 PROCEDURE — 36415 COLL VENOUS BLD VENIPUNCTURE: CPT

## 2023-12-16 PROCEDURE — 85027 COMPLETE CBC AUTOMATED: CPT

## 2023-12-21 ENCOUNTER — HOSPITAL ENCOUNTER (OUTPATIENT)
Dept: INTERVENTIONAL RADIOLOGY/VASCULAR | Facility: HOSPITAL | Age: 68
Discharge: HOME | End: 2023-12-21
Attending: INTERNAL MEDICINE | Admitting: INTERNAL MEDICINE
Payer: MEDICARE

## 2023-12-21 VITALS
BODY MASS INDEX: 28.13 KG/M2 | WEIGHT: 231 LBS | TEMPERATURE: 98 F | SYSTOLIC BLOOD PRESSURE: 107 MMHG | DIASTOLIC BLOOD PRESSURE: 71 MMHG | RESPIRATION RATE: 20 BRPM | HEART RATE: 66 BPM | OXYGEN SATURATION: 93 % | HEIGHT: 76 IN

## 2023-12-21 DIAGNOSIS — Z01.818 PREOP TESTING: Primary | ICD-10-CM

## 2023-12-21 DIAGNOSIS — Z01.810 ENCOUNTER FOR PREPROCEDURAL CARDIOVASCULAR EXAMINATION: ICD-10-CM

## 2023-12-21 PROCEDURE — 93799 UNLISTED CV SVC/PROCEDURE: CPT | Performed by: INTERNAL MEDICINE

## 2023-12-21 PROCEDURE — 36415 COLL VENOUS BLD VENIPUNCTURE: CPT

## 2023-12-21 PROCEDURE — 93454 CORONARY ARTERY ANGIO S&I: CPT | Performed by: INTERNAL MEDICINE

## 2023-12-21 PROCEDURE — 99152 MOD SED SAME PHYS/QHP 5/>YRS: CPT | Performed by: INTERNAL MEDICINE

## 2023-12-21 PROCEDURE — B2111ZZ FLUOROSCOPY OF MULTIPLE CORONARY ARTERIES USING LOW OSMOLAR CONTRAST: ICD-10-PCS | Performed by: INTERNAL MEDICINE

## 2023-12-21 PROCEDURE — 99153 MOD SED SAME PHYS/QHP EA: CPT | Performed by: INTERNAL MEDICINE

## 2023-12-21 RX ORDER — NITROGLYCERIN 20 MG/100ML
INJECTION INTRAVENOUS
Status: COMPLETED
Start: 2023-12-21 | End: 2023-12-21

## 2023-12-21 RX ORDER — LIDOCAINE HYDROCHLORIDE 20 MG/ML
INJECTION, SOLUTION EPIDURAL; INFILTRATION; INTRACAUDAL; PERINEURAL
Status: COMPLETED
Start: 2023-12-21 | End: 2023-12-21

## 2023-12-21 RX ORDER — MIDAZOLAM HYDROCHLORIDE 1 MG/ML
INJECTION INTRAMUSCULAR; INTRAVENOUS
Status: COMPLETED
Start: 2023-12-21 | End: 2023-12-21

## 2023-12-21 RX ORDER — VERAPAMIL HYDROCHLORIDE 2.5 MG/ML
INJECTION, SOLUTION INTRAVENOUS
Status: COMPLETED
Start: 2023-12-21 | End: 2023-12-21

## 2023-12-21 RX ORDER — HEPARIN SODIUM 1000 [USP'U]/ML
INJECTION, SOLUTION INTRAVENOUS; SUBCUTANEOUS
Status: COMPLETED
Start: 2023-12-21 | End: 2023-12-21

## 2023-12-21 RX ORDER — ASPIRIN 81 MG/1
324 TABLET, CHEWABLE ORAL ONCE
Status: DISCONTINUED | OUTPATIENT
Start: 2023-12-21 | End: 2023-12-21

## 2023-12-21 RX ORDER — SODIUM CHLORIDE 9 MG/ML
INJECTION, SOLUTION INTRAVENOUS
Status: DISCONTINUED | OUTPATIENT
Start: 2023-12-21 | End: 2023-12-21

## 2023-12-21 NOTE — IVS NOTE
DISCHARGE NOTE     Pt is able to sit up and ambulate without difficulty. Pt voided and tolerated fluids and food. Procedural site remains dry and intact with good circulation, motion, and sensation. No signs and symptoms of bleeding/hematoma noted. IV access removed  Instruction provided, patient/family verbalizes understanding. Dr. Bobby Lr spoke with patient/family post procedure. Pt discharge via wheelchair to [address] [place] [address] B       Follow up Appointment: already scheduled to see Dr. Lynne Lynch partner next week per pt. Pt instructed to call for TAVR at [facility] Prescription: none.

## 2023-12-21 NOTE — PROCEDURES
[facility]    PATIENT'S NAME: [facility]   ATTENDING PHYSICIAN: Bridger Kauffman DO   OPERATING PHYSICIAN: Bridger Flores DO   PATIENT ACCOUNT#:   [de-identified]    LOCATION:  [facility] Aditya JIMENE[facility]  MEDICAL RECORD #:   [id number]       YOB: 1955  ADMISSION DATE:       12/21/2023      OPERATION DATE:  12/21/2023    CARDIAC PROCEDURE TRANSCRIPTION      CARDIAC CATHETERIZATION  PREOPERATIVE DIAGNOSIS:    POSTOPERATIVE DIAGNOSIS:    PROCEDURE PERFORMED:    1.   Bilateral coronary angiogram.  2.   IFR of the proximal right coronary artery for indeterminate stenosis. SEDATION:  Conscious sedation was performed for 30 minutes by an independent nurse, monitoring the patient's blood pressure, oximetry, and heart rate. DESCRIPTION OF PROCEDURE:  Informed consent was obtained. The patient was placed in the cardiac catheterization lab. The right wrist was sterilely draped and prepped. Following 1% lidocaine anesthesia, the right radial artery was cannulated. A 6-Indonesian sheath was inserted. We used a 6-Indonesian TIG catheter for left and right coronary angiography. At the end of the procedure, a radial band was applied to achieve hemostasis. The patient received heparin, verapamil, and nitroglycerin for the diagnostic phase of this procedure. FINDINGS:  The left main is normal in appearance. The left anterior descending artery and its branches are normal in appearance. The circumflex coronary artery and its branches are normal in appearance. The right coronary artery is a dominant vessel, giving rise to the posterior descending artery. Ostial to proximal segment, there is a 60% to 70% area of stenosis versus more likely catheter-induced spasm because of the smooth appearance. INTERVENTIONAL PROCEDURE:  We used an additional 2000 units of heparin. We used a JR4 guide catheter. We used the [employer] iFR wire.   The iFR wire was used to cross the proximal portion of the right coronary artery and advance to the mid portion. IFR was performed, which was negative at 0.99, non-ischemic. IMPRESSION:    1. Normal left coronary system. 2.   Borderline 60% to 70% proximal right coronary artery stenosis, which appears more likely as catheter-induced spasm as the iFR was negative and smooth in appearance right where the catheter tip was sitting. RECOMMENDATIONS:  Valve surgery evaluation with surgical aortic valve replacement versus TAVR.     Dictated By Roxana Solano DO  d: 12/21/2023 10:27:02  t: 12/21/2023 11:01:01  [facility] [id number]  AS/

## 2023-12-21 NOTE — H&P
The above referenced H&P was reviewed by COLE ADKINS DO on 12/21/2023, the patient was examined and no significant changes have occurred in the patient's condition since the H&P was performed. Risks and benefits were discussed, proceed with procedure as planned.

## 2023-12-21 NOTE — BRIEF OP NOTE
Preop diagnosis: severe as  Post op diagnosis: same  Procedures: cor angio  Findings: RCA ostial with 60-70% vs more likely spasm. Negative iFR at 0.99.  LAD, LCX and LM are normal.  EBL: 20 mls  Specimens: None

## 2023-12-21 NOTE — DISCHARGE INSTRUCTIONS
Follow up with TAVR clinic at [facility]. Call for appointment: [phone]    TRANSRADIAL DISCHARGE INSTRUCTION  HOME CARE INSTRUCTIONS FOLLOWING CORONARY ANGIOGRAPHY    Activity  DO NOT drive after the procedure. You may resume driving late the following day according to the nurse or physician's instructions  Plan on resting and relaxing tonight and tomorrow  Resume your normal activity after 48 hours, or as instructed by your physician  Avoid drinking alcohol for the next 24 hours  Avoid wrist flexion, extension, and fine motor activities (i.e. texting, typing, using computer mouse, etc.) for 24 hours  Do not lift or pull anything heavier than 5 to 8  pounds with affected hand for 1 week    What is Normal?  A small lump at the procedure site associated with mild tenderness when touched  The procedure site may be bruised or discolored  There may be a small amount of drainage on the bandage    Special Instructions   Drink plenty of fluids during the next 24 hours to \"flush\" the contrast from your system  Keep the bandage clean and dry  After 24 hours, you must remove the bandage. Do not put ointment, powders, or creams to site.   You can shower after removing the bandage, and wash the procedure site gently with soap and water  DO NOT submerge the procedure site for 1 week (no bath tubs or pools)  If you choose to wear a bandage for a few days, make sure it remains clean and dry and that it is changed daily  For local swelling: apply ice  Bleeding can occur at the procedure site - both on the outside of the skin and/or beneath the surface of the skin        If bleeding occurs: Elevate hand above heart and apply local pressure  Swelling or a large lump at the procedure site can occur, which may be accompanied by moderate to severe pain  If the bleeding does not stop, call 911 and continue to apply pressure    When to contact physician:   Swelling, pain, or bleeding at the site that is not relieved by applying ice or pressure  Signs of infection: Redness, warmth, drainage at the site, chills, or temperature of 100.5 or greater  Changes in sensation, numbness, or tingling of affected hand    Other    You may resume your present diet, unless otherwise specified by your physician. A list of your medications was provided to you at discharge.

## 2024-03-18 ENCOUNTER — LAB ENCOUNTER (OUTPATIENT)
Dept: LAB | Age: 69
End: 2024-03-18
Attending: ORTHOPAEDIC SURGERY
Payer: MEDICARE

## 2024-03-18 DIAGNOSIS — Z01.818 PREOP TESTING: ICD-10-CM

## 2024-03-18 LAB
ANION GAP SERPL CALC-SCNC: 8 MMOL/L (ref 0–18)
BASOPHILS # BLD AUTO: 0.07 X10(3) UL (ref 0–0.2)
BASOPHILS NFR BLD AUTO: 0.8 %
BUN BLD-MCNC: 17 MG/DL (ref 9–23)
BUN/CREAT SERPL: 13.2 (ref 10–20)
CALCIUM BLD-MCNC: 9.5 MG/DL (ref 8.7–10.4)
CHLORIDE SERPL-SCNC: 107 MMOL/L (ref 98–112)
CO2 SERPL-SCNC: 24 MMOL/L (ref 21–32)
CREAT BLD-MCNC: 1.29 MG/DL
DEPRECATED RDW RBC AUTO: 40.8 FL (ref 35.1–46.3)
EGFRCR SERPLBLD CKD-EPI 2021: 60 ML/MIN/1.73M2 (ref 60–?)
EOSINOPHIL # BLD AUTO: 0.31 X10(3) UL (ref 0–0.7)
EOSINOPHIL NFR BLD AUTO: 3.7 %
ERYTHROCYTE [DISTWIDTH] IN BLOOD BY AUTOMATED COUNT: 12.4 % (ref 11–15)
GLUCOSE BLD-MCNC: 87 MG/DL (ref 70–99)
HCT VFR BLD AUTO: 44.6 %
HGB BLD-MCNC: 14.5 G/DL
IMM GRANULOCYTES # BLD AUTO: 0.05 X10(3) UL (ref 0–1)
IMM GRANULOCYTES NFR BLD: 0.6 %
LYMPHOCYTES # BLD AUTO: 1.47 X10(3) UL (ref 1–4)
LYMPHOCYTES NFR BLD AUTO: 17.7 %
MCH RBC QN AUTO: 29.5 PG (ref 26–34)
MCHC RBC AUTO-ENTMCNC: 32.5 G/DL (ref 31–37)
MCV RBC AUTO: 90.7 FL
MONOCYTES # BLD AUTO: 0.75 X10(3) UL (ref 0.1–1)
MONOCYTES NFR BLD AUTO: 9 %
NEUTROPHILS # BLD AUTO: 5.65 X10 (3) UL (ref 1.5–7.7)
NEUTROPHILS # BLD AUTO: 5.65 X10(3) UL (ref 1.5–7.7)
NEUTROPHILS NFR BLD AUTO: 68.2 %
OSMOLALITY SERPL CALC.SUM OF ELEC: 289 MOSM/KG (ref 275–295)
PLATELET # BLD AUTO: 261 10(3)UL (ref 150–450)
POTASSIUM SERPL-SCNC: 4.1 MMOL/L (ref 3.5–5.1)
RBC # BLD AUTO: 4.92 X10(6)UL
SODIUM SERPL-SCNC: 139 MMOL/L (ref 136–145)
WBC # BLD AUTO: 8.3 X10(3) UL (ref 4–11)

## 2024-03-18 PROCEDURE — 87641 MR-STAPH DNA AMP PROBE: CPT

## 2024-03-18 PROCEDURE — 80048 BASIC METABOLIC PNL TOTAL CA: CPT

## 2024-03-18 PROCEDURE — 36415 COLL VENOUS BLD VENIPUNCTURE: CPT

## 2024-03-18 PROCEDURE — 85025 COMPLETE CBC W/AUTO DIFF WBC: CPT

## 2024-03-18 RX ORDER — CHOLECALCIFEROL (VITAMIN D3) 125 MCG
2000 CAPSULE ORAL DAILY
COMMUNITY

## 2024-03-18 NOTE — CM/SW NOTE
CM received email from Jackie Craven  liaison, Pt has been [facility] prior to surgery by Dr. Amezquita.    [facility] to send HH ref via [facility] upon admission.   F2F needed.    Yamilka Drew RN, BSN  Nurse   [phone]

## 2024-03-19 LAB — MRSA DNA SPEC QL NAA+PROBE: NEGATIVE

## 2024-03-21 ENCOUNTER — HOSPITAL ENCOUNTER (OUTPATIENT)
Facility: HOSPITAL | Age: 69
Discharge: HOME HEALTH | End: 2024-03-23
Attending: ORTHOPAEDIC SURGERY | Admitting: ORTHOPAEDIC SURGERY
Payer: MEDICARE

## 2024-03-21 ENCOUNTER — APPOINTMENT (OUTPATIENT)
Dept: GENERAL RADIOLOGY | Facility: HOSPITAL | Age: 69
End: 2024-03-21
Attending: ORTHOPAEDIC SURGERY
Payer: MEDICARE

## 2024-03-21 ENCOUNTER — ANESTHESIA (OUTPATIENT)
Dept: SURGERY | Facility: HOSPITAL | Age: 69
End: 2024-03-21
Payer: MEDICARE

## 2024-03-21 ENCOUNTER — ANESTHESIA EVENT (OUTPATIENT)
Dept: SURGERY | Facility: HOSPITAL | Age: 69
End: 2024-03-21
Payer: MEDICARE

## 2024-03-21 DIAGNOSIS — M17.11 PRIMARY OSTEOARTHRITIS OF RIGHT KNEE: ICD-10-CM

## 2024-03-21 DIAGNOSIS — Z01.818 PREOP TESTING: Primary | ICD-10-CM

## 2024-03-21 PROBLEM — E03.9 HYPOTHYROIDISM: Status: ACTIVE | Noted: 2024-03-21

## 2024-03-21 PROCEDURE — 0SRC0J9 REPLACEMENT OF RIGHT KNEE JOINT WITH SYNTHETIC SUBSTITUTE, CEMENTED, OPEN APPROACH: ICD-10-PCS | Performed by: ORTHOPAEDIC SURGERY

## 2024-03-21 PROCEDURE — 99204 OFFICE O/P NEW MOD 45 MIN: CPT | Performed by: HOSPITALIST

## 2024-03-21 PROCEDURE — 73560 X-RAY EXAM OF KNEE 1 OR 2: CPT | Performed by: ORTHOPAEDIC SURGERY

## 2024-03-21 DEVICE — IMPLANTABLE DEVICE
Type: IMPLANTABLE DEVICE | Site: KNEE | Status: FUNCTIONAL
Brand: BIOMET® BONE CEMENT R

## 2024-03-21 DEVICE — IMPLANTABLE DEVICE
Type: IMPLANTABLE DEVICE | Site: KNEE | Status: FUNCTIONAL
Brand: PERSONA®

## 2024-03-21 DEVICE — IMPLANTABLE DEVICE
Type: IMPLANTABLE DEVICE | Site: KNEE | Status: FUNCTIONAL
Brand: PERSONA® VIVACIT-E®

## 2024-03-21 DEVICE — IMPLANTABLE DEVICE
Type: IMPLANTABLE DEVICE | Site: KNEE | Status: FUNCTIONAL
Brand: PERSONA® NATURAL TIBIA®

## 2024-03-21 RX ORDER — SENNOSIDES 8.6 MG
17.2 TABLET ORAL NIGHTLY
Status: DISCONTINUED | OUTPATIENT
Start: 2024-03-21 | End: 2024-03-23

## 2024-03-21 RX ORDER — ENEMA 19; 7 G/133ML; G/133ML
1 ENEMA RECTAL ONCE AS NEEDED
Status: DISCONTINUED | OUTPATIENT
Start: 2024-03-21 | End: 2024-03-23

## 2024-03-21 RX ORDER — ONDANSETRON 2 MG/ML
4 INJECTION INTRAMUSCULAR; INTRAVENOUS ONCE AS NEEDED
Status: ACTIVE | OUTPATIENT
Start: 2024-03-21 | End: 2024-03-21

## 2024-03-21 RX ORDER — NALBUPHINE HYDROCHLORIDE 10 MG/ML
2.5 INJECTION, SOLUTION INTRAMUSCULAR; INTRAVENOUS; SUBCUTANEOUS EVERY 4 HOURS PRN
Status: DISCONTINUED | OUTPATIENT
Start: 2024-03-21 | End: 2024-03-22

## 2024-03-21 RX ORDER — TRANEXAMIC ACID 650 MG/1
1300 TABLET ORAL ONCE
Status: COMPLETED | OUTPATIENT
Start: 2024-03-21 | End: 2024-03-21

## 2024-03-21 RX ORDER — DIPHENHYDRAMINE HYDROCHLORIDE 50 MG/ML
25 INJECTION INTRAMUSCULAR; INTRAVENOUS ONCE AS NEEDED
Status: ACTIVE | OUTPATIENT
Start: 2024-03-21 | End: 2024-03-21

## 2024-03-21 RX ORDER — DIPHENHYDRAMINE HYDROCHLORIDE 50 MG/ML
12.5 INJECTION INTRAMUSCULAR; INTRAVENOUS EVERY 4 HOURS PRN
Status: DISCONTINUED | OUTPATIENT
Start: 2024-03-21 | End: 2024-03-22

## 2024-03-21 RX ORDER — PROCHLORPERAZINE EDISYLATE 5 MG/ML
5 INJECTION INTRAMUSCULAR; INTRAVENOUS ONCE AS NEEDED
Status: ACTIVE | OUTPATIENT
Start: 2024-03-21 | End: 2024-03-21

## 2024-03-21 RX ORDER — HYDROMORPHONE HYDROCHLORIDE 1 MG/ML
0.4 INJECTION, SOLUTION INTRAMUSCULAR; INTRAVENOUS; SUBCUTANEOUS EVERY 5 MIN PRN
Status: DISCONTINUED | OUTPATIENT
Start: 2024-03-21 | End: 2024-03-21 | Stop reason: HOSPADM

## 2024-03-21 RX ORDER — MORPHINE SULFATE 1 MG/ML
INJECTION, SOLUTION EPIDURAL; INTRATHECAL; INTRAVENOUS
Status: COMPLETED | OUTPATIENT
Start: 2024-03-21 | End: 2024-03-21

## 2024-03-21 RX ORDER — HYDROMORPHONE HYDROCHLORIDE 1 MG/ML
0.4 INJECTION, SOLUTION INTRAMUSCULAR; INTRAVENOUS; SUBCUTANEOUS
Status: DISCONTINUED | OUTPATIENT
Start: 2024-03-21 | End: 2024-03-22

## 2024-03-21 RX ORDER — POLYETHYLENE GLYCOL 3350 17 G/17G
17 POWDER, FOR SOLUTION ORAL DAILY PRN
Status: DISCONTINUED | OUTPATIENT
Start: 2024-03-21 | End: 2024-03-23

## 2024-03-21 RX ORDER — ONDANSETRON 2 MG/ML
4 INJECTION INTRAMUSCULAR; INTRAVENOUS EVERY 6 HOURS PRN
Status: DISCONTINUED | OUTPATIENT
Start: 2024-03-21 | End: 2024-03-21 | Stop reason: HOSPADM

## 2024-03-21 RX ORDER — ONDANSETRON 2 MG/ML
4 INJECTION INTRAMUSCULAR; INTRAVENOUS EVERY 6 HOURS PRN
Status: DISCONTINUED | OUTPATIENT
Start: 2024-03-21 | End: 2024-03-23

## 2024-03-21 RX ORDER — MORPHINE SULFATE 4 MG/ML
4 INJECTION, SOLUTION INTRAMUSCULAR; INTRAVENOUS EVERY 10 MIN PRN
Status: DISCONTINUED | OUTPATIENT
Start: 2024-03-21 | End: 2024-03-21 | Stop reason: HOSPADM

## 2024-03-21 RX ORDER — HYDROMORPHONE HYDROCHLORIDE 1 MG/ML
0.2 INJECTION, SOLUTION INTRAMUSCULAR; INTRAVENOUS; SUBCUTANEOUS EVERY 2 HOUR PRN
Status: DISCONTINUED | OUTPATIENT
Start: 2024-03-21 | End: 2024-03-23

## 2024-03-21 RX ORDER — CETIRIZINE HYDROCHLORIDE 10 MG/1
10 TABLET ORAL DAILY
Status: DISCONTINUED | OUTPATIENT
Start: 2024-03-22 | End: 2024-03-23

## 2024-03-21 RX ORDER — HYDROMORPHONE HYDROCHLORIDE 1 MG/ML
0.6 INJECTION, SOLUTION INTRAMUSCULAR; INTRAVENOUS; SUBCUTANEOUS EVERY 5 MIN PRN
Status: DISCONTINUED | OUTPATIENT
Start: 2024-03-21 | End: 2024-03-21 | Stop reason: HOSPADM

## 2024-03-21 RX ORDER — HYDROCODONE BITARTRATE AND ACETAMINOPHEN 7.5; 325 MG/1; MG/1
2 TABLET ORAL EVERY 6 HOURS PRN
Status: DISCONTINUED | OUTPATIENT
Start: 2024-03-21 | End: 2024-03-22

## 2024-03-21 RX ORDER — BUPIVACAINE HYDROCHLORIDE 7.5 MG/ML
INJECTION, SOLUTION INTRASPINAL
Status: COMPLETED | OUTPATIENT
Start: 2024-03-21 | End: 2024-03-21

## 2024-03-21 RX ORDER — CYCLOBENZAPRINE HCL 5 MG
5 TABLET ORAL EVERY 8 HOURS PRN
Status: DISCONTINUED | OUTPATIENT
Start: 2024-03-21 | End: 2024-03-23

## 2024-03-21 RX ORDER — ACETAMINOPHEN 500 MG
1000 TABLET ORAL ONCE
Status: COMPLETED | OUTPATIENT
Start: 2024-03-21 | End: 2024-03-21

## 2024-03-21 RX ORDER — BISACODYL 10 MG
10 SUPPOSITORY, RECTAL RECTAL
Status: DISCONTINUED | OUTPATIENT
Start: 2024-03-21 | End: 2024-03-23

## 2024-03-21 RX ORDER — HYDROMORPHONE HYDROCHLORIDE 1 MG/ML
0.2 INJECTION, SOLUTION INTRAMUSCULAR; INTRAVENOUS; SUBCUTANEOUS EVERY 5 MIN PRN
Status: DISCONTINUED | OUTPATIENT
Start: 2024-03-21 | End: 2024-03-21 | Stop reason: HOSPADM

## 2024-03-21 RX ORDER — HYDROMORPHONE HYDROCHLORIDE 1 MG/ML
0.4 INJECTION, SOLUTION INTRAMUSCULAR; INTRAVENOUS; SUBCUTANEOUS EVERY 2 HOUR PRN
Status: DISCONTINUED | OUTPATIENT
Start: 2024-03-21 | End: 2024-03-23

## 2024-03-21 RX ORDER — DOCUSATE SODIUM 100 MG/1
100 CAPSULE, LIQUID FILLED ORAL 2 TIMES DAILY
Status: DISCONTINUED | OUTPATIENT
Start: 2024-03-21 | End: 2024-03-23

## 2024-03-21 RX ORDER — HYDROCODONE BITARTRATE AND ACETAMINOPHEN 7.5; 325 MG/1; MG/1
1 TABLET ORAL EVERY 6 HOURS PRN
Status: DISCONTINUED | OUTPATIENT
Start: 2024-03-21 | End: 2024-03-22

## 2024-03-21 RX ORDER — DIPHENHYDRAMINE HCL 25 MG
25 CAPSULE ORAL EVERY 4 HOURS PRN
Status: DISCONTINUED | OUTPATIENT
Start: 2024-03-21 | End: 2024-03-22

## 2024-03-21 RX ORDER — METOCLOPRAMIDE HYDROCHLORIDE 5 MG/ML
10 INJECTION INTRAMUSCULAR; INTRAVENOUS EVERY 8 HOURS PRN
Status: DISCONTINUED | OUTPATIENT
Start: 2024-03-21 | End: 2024-03-21 | Stop reason: HOSPADM

## 2024-03-21 RX ORDER — SODIUM CHLORIDE, SODIUM LACTATE, POTASSIUM CHLORIDE, CALCIUM CHLORIDE 600; 310; 30; 20 MG/100ML; MG/100ML; MG/100ML; MG/100ML
INJECTION, SOLUTION INTRAVENOUS CONTINUOUS
Status: DISCONTINUED | OUTPATIENT
Start: 2024-03-21 | End: 2024-03-23

## 2024-03-21 RX ORDER — OXYCODONE HYDROCHLORIDE 5 MG/1
2.5 TABLET ORAL EVERY 4 HOURS PRN
Status: DISCONTINUED | OUTPATIENT
Start: 2024-03-21 | End: 2024-03-23

## 2024-03-21 RX ORDER — DIPHENHYDRAMINE HCL 25 MG
25 CAPSULE ORAL EVERY 4 HOURS PRN
Status: DISCONTINUED | OUTPATIENT
Start: 2024-03-21 | End: 2024-03-23

## 2024-03-21 RX ORDER — DIPHENHYDRAMINE HYDROCHLORIDE 50 MG/ML
12.5 INJECTION INTRAMUSCULAR; INTRAVENOUS EVERY 4 HOURS PRN
Status: DISCONTINUED | OUTPATIENT
Start: 2024-03-21 | End: 2024-03-23

## 2024-03-21 RX ORDER — HALOPERIDOL 5 MG/ML
0.5 INJECTION INTRAMUSCULAR ONCE AS NEEDED
Status: ACTIVE | OUTPATIENT
Start: 2024-03-21 | End: 2024-03-21

## 2024-03-21 RX ORDER — MORPHINE SULFATE 4 MG/ML
2 INJECTION, SOLUTION INTRAMUSCULAR; INTRAVENOUS EVERY 10 MIN PRN
Status: DISCONTINUED | OUTPATIENT
Start: 2024-03-21 | End: 2024-03-21 | Stop reason: HOSPADM

## 2024-03-21 RX ORDER — NALOXONE HYDROCHLORIDE 0.4 MG/ML
80 INJECTION, SOLUTION INTRAMUSCULAR; INTRAVENOUS; SUBCUTANEOUS AS NEEDED
Status: DISCONTINUED | OUTPATIENT
Start: 2024-03-21 | End: 2024-03-21 | Stop reason: HOSPADM

## 2024-03-21 RX ORDER — LEVOTHYROXINE SODIUM 0.05 MG/1
50 TABLET ORAL
Status: DISCONTINUED | OUTPATIENT
Start: 2024-03-22 | End: 2024-03-23

## 2024-03-21 RX ORDER — SODIUM CHLORIDE, SODIUM LACTATE, POTASSIUM CHLORIDE, CALCIUM CHLORIDE 600; 310; 30; 20 MG/100ML; MG/100ML; MG/100ML; MG/100ML
INJECTION, SOLUTION INTRAVENOUS CONTINUOUS
Status: DISCONTINUED | OUTPATIENT
Start: 2024-03-21 | End: 2024-03-21 | Stop reason: HOSPADM

## 2024-03-21 RX ORDER — ACETAMINOPHEN 325 MG/1
650 TABLET ORAL EVERY 6 HOURS PRN
Status: DISCONTINUED | OUTPATIENT
Start: 2024-03-21 | End: 2024-03-22

## 2024-03-21 RX ORDER — ATORVASTATIN CALCIUM 20 MG/1
20 TABLET, FILM COATED ORAL NIGHTLY
Status: DISCONTINUED | OUTPATIENT
Start: 2024-03-21 | End: 2024-03-23

## 2024-03-21 RX ORDER — MORPHINE SULFATE 10 MG/ML
6 INJECTION, SOLUTION INTRAMUSCULAR; INTRAVENOUS EVERY 10 MIN PRN
Status: DISCONTINUED | OUTPATIENT
Start: 2024-03-21 | End: 2024-03-21 | Stop reason: HOSPADM

## 2024-03-21 RX ORDER — NALOXONE HYDROCHLORIDE 0.4 MG/ML
0.08 INJECTION, SOLUTION INTRAMUSCULAR; INTRAVENOUS; SUBCUTANEOUS
Status: DISCONTINUED | OUTPATIENT
Start: 2024-03-21 | End: 2024-03-22

## 2024-03-21 RX ORDER — CEFAZOLIN SODIUM/WATER 2 G/20 ML
2 SYRINGE (ML) INTRAVENOUS EVERY 8 HOURS
Qty: 40 ML | Refills: 0 | Status: COMPLETED | OUTPATIENT
Start: 2024-03-21 | End: 2024-03-22

## 2024-03-21 RX ORDER — METOCLOPRAMIDE HYDROCHLORIDE 5 MG/ML
10 INJECTION INTRAMUSCULAR; INTRAVENOUS EVERY 8 HOURS PRN
Status: DISCONTINUED | OUTPATIENT
Start: 2024-03-21 | End: 2024-03-23

## 2024-03-21 RX ORDER — CEFAZOLIN SODIUM/WATER 2 G/20 ML
2 SYRINGE (ML) INTRAVENOUS ONCE
Status: COMPLETED | OUTPATIENT
Start: 2024-03-21 | End: 2024-03-21

## 2024-03-21 RX ORDER — HYDROMORPHONE HYDROCHLORIDE 1 MG/ML
0.6 INJECTION, SOLUTION INTRAMUSCULAR; INTRAVENOUS; SUBCUTANEOUS
Status: DISCONTINUED | OUTPATIENT
Start: 2024-03-21 | End: 2024-03-22

## 2024-03-21 RX ORDER — EPHEDRINE SULFATE 50 MG/ML
INJECTION INTRAVENOUS AS NEEDED
Status: DISCONTINUED | OUTPATIENT
Start: 2024-03-21 | End: 2024-03-21 | Stop reason: SURG

## 2024-03-21 RX ORDER — OXYCODONE HYDROCHLORIDE 5 MG/1
5 TABLET ORAL EVERY 4 HOURS PRN
Status: DISCONTINUED | OUTPATIENT
Start: 2024-03-21 | End: 2024-03-23

## 2024-03-21 RX ADMIN — SODIUM CHLORIDE, SODIUM LACTATE, POTASSIUM CHLORIDE, CALCIUM CHLORIDE: 600; 310; 30; 20 INJECTION, SOLUTION INTRAVENOUS at 11:11:00

## 2024-03-21 RX ADMIN — BUPIVACAINE HYDROCHLORIDE 1.8 ML: 7.5 INJECTION, SOLUTION INTRASPINAL at 11:17:00

## 2024-03-21 RX ADMIN — EPHEDRINE SULFATE 15 MG: 50 INJECTION INTRAVENOUS at 11:21:00

## 2024-03-21 RX ADMIN — CEFAZOLIN SODIUM/WATER 2 G: 2 G/20 ML SYRINGE (ML) INTRAVENOUS at 11:23:00

## 2024-03-21 RX ADMIN — MORPHINE SULFATE 0.3 MG: 1 INJECTION, SOLUTION EPIDURAL; INTRATHECAL; INTRAVENOUS at 11:17:00

## 2024-03-21 RX ADMIN — SODIUM CHLORIDE, SODIUM LACTATE, POTASSIUM CHLORIDE, CALCIUM CHLORIDE: 600; 310; 30; 20 INJECTION, SOLUTION INTRAVENOUS at 12:19:00

## 2024-03-21 NOTE — ANESTHESIA POSTPROCEDURE EVALUATION
Patient: Jorge Luis Bautista    Procedure Summary       Date: 03/21/24 Room / Location: [facility] MAIN OR  / [facility] MAIN OR    Anesthesia Start: 1111 Anesthesia Stop: 1244    Procedure: Right total knee arthroplasty (Right: Knee) Diagnosis: (Osteoarthritis)    Surgeons: Mario Lozada MD Anesthesiologist: Glenroy Fajardo MD    Anesthesia Type: spinal ASA Status: 3            Anesthesia Type: spinal    Vitals Value Taken Time   /72 03/21/24 1354   Temp 97.5 °F (36.4 °C) 03/21/24 1357   Pulse 59 03/21/24 1354   Resp 16 03/21/24 1357   SpO2 95 % 03/21/24 1357   Vitals shown include unfiled device data.    [facility] AN Post Evaluation:   Patient Evaluated in PACU  Patient Participation: waiting for patient participation  Level of Consciousness: sleepy but conscious  Pain Score: 0  Pain Management: adequate  Airway Patency:patent  Yes    Cardiovascular Status: acceptable and hemodynamically stable  Respiratory Status: acceptable, spontaneous ventilation and nonlabored ventilation  Postoperative Hydration euvolemic      Glenroy Fajardo MD  3/21/2024 2:04 PM

## 2024-03-21 NOTE — CONSULTS
[facility]    PATIENT'S NAME: JERRY SANDOVAL   ATTENDING PHYSICIAN: Mario Lozada MD   CONSULTING PHYSICIAN: Keon Ballard MD   PATIENT ACCOUNT#:   [id number]    LOCATION:  [facility] PACU 3 [facility] 10  MEDICAL RECORD #:   [id number]       YOB: 1955  ADMISSION DATE:       03/21/2024      CONSULT DATE:  03/21/2024    REPORT OF CONSULTATION      REASON FOR ADMISSION:  Right total knee arthroplasty.    HISTORY OF PRESENT ILLNESS:  The patient is a 68-year-old  male with chronic right knee pain and underlying end-stage severe primary osteoarthritis, failed outpatient conservative medical management options, scheduled today for the above-mentioned procedure by his orthopedic surgeon, Dr. Mario Lozada.  Preoperatively he had spinal block.  Postoperatively transferred to PACU for further monitoring.    PAST MEDICAL HISTORY:  Generalized osteoarthritis, lumbar spinal stenosis, hyperlipidemia, hypothyroidism, transient ischemic attack.    PAST SURGICAL HISTORY:  Left total knee arthroplasty; cholecystectomy; left hand tendon repair; right carpal tunnel release; right rotator cuff repair; right chest wall basal cell carcinoma resection; aortic stenosis, status post aortic valve replacement with bioprosthesis.     MEDICATIONS:  Please see medication reconciliation list.    ALLERGIES:  Bacitracin.     SOCIAL HISTORY:  No tobacco or drug use.  Social alcohol.  Lives with his family.  Independent in his basic activities of daily living.    FAMILY HISTORY:  Father had laryngeal and lung cancer.  Mother had aortic aneurysm.    REVIEW OF SYSTEMS:  Currently resting in bed.  No right knee pain.  No chest pain.  No shortness of breath.  Other 12-point review of systems negative.          PHYSICAL EXAMINATION:    GENERAL:  Alert.  Oriented to time, place, and person.  No acute distress.  VITAL SIGNS:  Temperature 97.4, pulse 86, respiratory rate 13, blood pressure 121/55, pulse ox 95% on 2L  nasal cannula oxygen.  HEENT:  Atraumatic.  Oropharynx clear.  Moist mucous membranes.  Ears and nose normal.  Eyes:  Anicteric sclerae.  NECK:  Supple.  No lymphadenopathy.  Trachea midline.  Full range of motion.  LUNGS:  Clear to auscultation bilaterally.  Normal respiratory effort.   HEART:  Regular rate and rhythm.  S1 and S2 auscultated.  No murmur.  ABDOMEN:  Soft, nondistended.  No tenderness.  Positive bowel sounds.  EXTREMITIES:  Right knee dressing.  Hemovac surgical drain.  No leg edema.  No clubbing or cyanosis.  NEUROLOGIC:  Decreased sensation and muscle movement, both lower extremities, consistent with spinal block.  No other focal findings.     ASSESSMENT AND PLAN:    1.   Right knee primary osteoarthritis, status post right total knee arthroplasty.  Spinal block.  Pain control.  Neurovascular checks.  Xarelto for DVT prophylaxis.  Monitor surgical wound and drain.  Physical and occupational therapy.  2.   Hyperlipidemia.  Continue home medications.  3.   Hypothyroidism.  Continue home medications.    Dictated By Keon Ballard MD  d: 03/21/2024 12:53:14  t: 03/21/2024 13:42:35  Job [id number]  /

## 2024-03-21 NOTE — OPERATIVE REPORT
[facility]  part of [facility]    Operative Note         Jorge Luis Bautista Location: OR   CSN [id number] MRN [id number]   Admission Date 3/21/2024 Operation Date 3/21/2024   Attending Physician Mario Lozada MD       Patient Name: Jorge Luis Bautista     Preoperative Diagnosis: Osteoarthritis     Postoperative Diagnosis: Osteoarthritis     Procedure(s):  Right total knee arthroplasty     Primary Surgeon: Mario Lozada MD     Surgical Assistant.: Rhea Tavares Volodymyr, CSA     Anesthesia: Spinal     Specimen:   ID Type Source Tests Collected by Time Destination   1 : 1. Right knee bone and tissue Tissue Knee, right SURGICAL PATHOLOGY TISSUE Mario Lozada MD 3/21/2024 11:49 AM         Estimated Blood Loss: No data recorded   Complications: none      Indications for procedure: Patient is a 68-year-old male with severe osteoarthritis of his left knee.  Failed conservative management.  Elected a left total knee arthroplasty.  Understood the surgery and postop course.  Understood the risks including but not limited to infection continued pain nerve injury DVT loss of motion and failure of the component.    Surgical Findings: Severe DJD    Complexity:    (optional)    Operative Summary:   Patient was brought in the operating room, placed on the table in supine position.  Spinal anesthesia was applied.  The left leg was prepped and draped in sterile manner.  Left leg was exsanguinated.  Proximal thigh tourniquet was inflated.  Midline incision was made centered over the patella through subcutaneous tissue.  Medial and lateral skin flaps were elevated.  Anterior bursa was resected.  A asad was made at the VMO insertion.  Medial parapatellar arthrotomy was performed.  Capsule was released, the posteromedial corner of the proximal tibia.  Patella was everted.  Next, the knee was flexed to 90 degrees.  Fat pad was excised.  The ACL was resected.  The medial and lateral menisci  were resected.  The tibia was subluxed anteriorly.  External tibial cutting guide was placed in line with the tibial crest, cutting block was pinned 2 mm below the medial aspect, and the oscillating saw was used to resect the articular surface.  Attention made to the femur.  Supracondylar flare was debrided of soft tissue.  Center hole was made anterior intercondylar notch.  Distal cutting block was set at 5 degrees of valgus, impacted flush to the femur.  Distal femoral cut was performed.  The sizing apparatus was placed on the posterior condyles.  It was then sized to an 11Narrow.  The 3-degree rotational guide holes were drilled.  The cutting block was placed through the guide hole, and the 4 distal femoral cuts were performed.  The posterior osteophytes were removed.  Posterior capsule was released, and attention was made back to the tibia.  Tibia was sized to a size G, was placed in proper rotation to the tibial tubercle.  Alignment was checked, shown to bifurcate the ankle.  Center hole was drilled.  Broach was impacted.  Trial femur was then placed.  The 2 peg holes were drilled, and the size 10 poly was placed.  The knee was placed through range of motion and shown to have full extension and proper balance throughout flexion.  Patella was everted, measured to a thickness of 26 mm.  It was reamed down 10 mm, sized to a 35.  The 3 peg holes were drilled, and the 35 poly re-established the thickness of the patella and had proper tracking.  Trial components were removed.  The knee was thoroughly irrigated with antibiotic lavage and dried.  Cement was prepared.  The tibia and femoral components were cemented into position.  The knee was placed into extension with a size 12 poly, and the patella was cemented into position.  All excess cement was removed.  The cement was allowed to harden.  The knee was rechecked.  It was felt a size 11 poly was the appropriate size.  Tray was debrided, and a size 11 poly was then  locked into position.  The knee was irrigated again.  A drain was placed exiting out the anterolateral thigh deep to the extensor mechanism.  Medial parapatellar arthrotomy was repaired with 1 Ethibond suture, and 0 Vicryl and 2-0 Vicryl were used to close subcutaneous tissue.  Staples were used on the skin.  Sterile dressing was applied.  The patient was awoken from anesthesia and brought to recovery room in stable condition. .    Implants:   Implant Name Type Inv. Item Serial No.  Lot No. LRB No. Used Action   CEMENT BNE 40GM HI VISC RADPQ BIOMET - SN/A  CEMENT BNE 40GM HI VISC RADPQ BIOMET N/A [employer]  [id number] Right 1 Implanted   CEMENT BNE 40GM HI VISC RADPQ BIOMET - SN/A  CEMENT BNE 40GM HI VISC RADPQ BIOMET N/A [employer]  [id number] Right 1 Implanted   COMPONENT FEM SZ 11 JOSSY R KNEE COCR QUAN PS - SN/A  COMPONENT FEM SZ 11 JOSSY R KNEE COCR QUAN PS N/A [employer]  [id number] Right 1 Implanted   COMPONENT PAT 35MM THK9MM STD QUAN PERSONA - SN/A  COMPONENT PAT 35MM THK9MM STD QUAN PERSONA N/A [employer]  [id number] Right 1 Implanted   COMPONENT TIB SZ G 5DEG R QUAN STEM PERSONA - SN/A  COMPONENT TIB SZ G 5DEG R QUAN STEM PERSONA N/A [employer]  [id number] Right 1 Implanted   SURFACE ARTC TIB SZ G-H FEM SZ 10-12 H11MM R - SN/A  SURFACE ARTC TIB SZ G-H FEM SZ 10-12 H11MM R N/A [employer]  [id number] Right 1 Implanted        Drains: Hemovac    Condition: Stable        Mario Lozada MD

## 2024-03-21 NOTE — BRIEF OP NOTE
Pre-Operative Diagnosis: Osteoarthritis     Post-Operative Diagnosis: Osteoarthritis      Procedure Performed:   Right total knee arthroplasty    Surgeon(s) and Role:     * Mario Lozada MD - Primary    Assistant(s):  Surgical Assistant.: Rhea Tavares Volodymyr, CSA     Surgical Findings: severe DJD     Specimen: bone     Estimated Blood Loss: No data recorded    Dictation Number:        Mario Lozada MD  3/21/2024  12:40 PM

## 2024-03-21 NOTE — ANESTHESIA PROCEDURE NOTES
Spinal Block    Date/Time: 3/21/2024 11:17 AM    Performed by: Glenroy Fajardo MD  Authorized by: Glenroy Fajardo MD      General Information and Staff    Start Time:  3/21/2024 11:17 AM  End Time:  3/21/2024 11:19 AM  Anesthesiologist:  Glenroy Fajardo MD  Performed by:  Anesthesiologist  Patient Location:  OR  Site identification: surface landmarks    Reason for Block: at surgeon's request, post-op pain management and surgical anesthesia    Preanesthetic Checklist: patient identified, IV checked, risks and benefits discussed, monitors and equipment checked, pre-op evaluation, timeout performed, anesthesia consent and sterile technique used      Procedure Details    Patient Position:  Sitting  Prep: ChloraPrep    Monitoring:  Cardiac monitor  Approach:  Midline  Location:  L3-4  Injection Technique:  Single-shot    Needle    Needle Type:  Pencil-tip  Needle Gauge:  24 G  Needle Length:  3.5 in    Assessment    Sensory Level:  T10  Events: clear CSF, CSF aspirated, well tolerated and blood negative      Additional Comments

## 2024-03-21 NOTE — H&P
[facility]  part of [facility]    History & Physical    Jorge Luis Bautista Patient Status:  Outpatient in a Bed    10/2/1955 MRN [id number]   Location [facility] PRE OP RECOVERY Attending Mario Lozada MD   Hosp Day # 0 PCP TITI VARGAS MD     Date of Admission:  3/21/2024    History of Present Illness:  Jorge Luis Bautista is a(n) 68 year old male. Pt with h/o right knee severe DJD.  Pt. Failed conservative management.  He complains of debilitating pain. He elects right total knee arthroplasty.  Surgery risks including but not limited to fx/ nerve injury/ blood loss/ blood clots/ infection/ contracture/ failure of prosthesis and continued pain were discussed.  Pt. Understands postop course.  He wishes to proceed.     History:  Past Medical History:   Diagnosis Date    ALLERGIC RHINITIS     Allergic rhinitis, cause unspecified     Cancer (HCC)     SKIN     Cataract     Disorder of thyroid     High cholesterol     History of blood transfusion         Osteoarthritis     PONV (postoperative nausea and vomiting)     Spinal stenosis     Stroke (HCC)     TIA 2012    TIA (transient ischemic attack)      Past Surgical History:   Procedure Laterality Date    ARTHROSCOPY, KNEE, SURGICAL FOR IMPLANTATION OF CULTURED ANALOGOUS CHONDROCYTES      torn meniscus    CHOLECYSTECTOMY      COLONOSCOPY  2015    osis    HAND TENDON PULLEY RECONST,GRAFT Left     OTHER SURGICAL HISTORY Right     Hand carpal tunnel release    OTHER SURGICAL HISTORY      Heart valve aortic  replacement    REMOVAL GALLBLADDER      REPAIR ROTATOR CUFF,ACUTE Right     REPR HAND/FOOT NERVE,MEDIAN MOTOR      in 1970's    SKIN SURGERY  2018    Exc of BCC to right medial chest    SKIN SURGERY  2020    BCC to Left Chest WLE by AP     TOTAL KNEE REPLACEMENT Left 10/20/2016    Dr. Amezquita     Family History   Problem Relation Age of Onset    Cancer Father         larynx, lung in 86    Heart Disorder  Father         aortic annyerysm    Other (Other) Father         macular degenation     Heart Disorder Mother         pacemaker- enlarged heart    Other (Other) Mother         smoker 60+ years  emphysema now 86 yo      reports that he has never smoked. He has never used smokeless tobacco. He reports current alcohol use. He reports that he does not use drugs.    Allergies:  Allergies   Allergen Reactions    Bacitracin HIVES       Home Medications:  Medications Prior to Admission   Medication Sig Dispense Refill Last Dose    cholecalciferol 50 MCG (2000 UT) Oral Tab Take 1 tablet (2,000 Units total) by mouth daily.   3/20/2024    cromolyn 5.2 MG/ACT Nasal Aero Soln 1 spray by Nasal route in the morning and 1 spray before bedtime.   3/20/2024    METRONIDAZOLE 1 % External Gel Apply to face once to twice daily. (Patient taking differently: daily. Apply to face once to twice daily.) 60 g 0 3/21/2024    levothyroxine 50 MCG Oral Tab Take 1 tablet (50 mcg total) by mouth before breakfast.   3/21/2024    Aspirin 81 MG Oral Cap Take 81 mg by mouth daily. He stop it on March 15,2024   3/15/2024    Omega-3 Fatty Acids (FISH OIL) 1200 MG Oral Cap Take 1 tablet by mouth daily.   3/18/2024    Atorvastatin Calcium 20 MG Oral Tab Take 1 tablet (20 mg total) by mouth nightly.   3/20/2024    cetirizine 10 MG Oral Tab Take 1 tablet (10 mg total) by mouth daily.   3/21/2024    DiphenhydrAMINE HCl, Sleep, (SLEEP AID OR) Take 1 tablet by mouth nightly as needed.   3/20/2024    OCUVITE-LUTEIN OR CAPS Take 1 tablet by mouth daily.   3/18/2024    CENTRUM SILVER OR Take 1 tablet by mouth daily.   3/20/2024       Review of Systems:  10 point review of symptoms found to be Non Contributory    Physical Exam:  General: Alert, orientated x3.  Cooperative.  No apparent distress.  Vital Signs:  /72 (BP Location: Left arm)   Pulse 81   Temp 97.8 °F (36.6 °C) (Oral)   Resp 16   Ht 6' 4\" (1.93 m)   Wt 239 lb (108.4 kg)   SpO2 96%   BMI  29.09 kg/m²   HEENT: Exam is unremarkable.  Without scleral icterus.  Mucous membranes are moist. Pupils are equal and round, reactive to light and accommodate.  Pupils are approximately 3mm and react to 2mm with reaction to light.  Oropharynx is clear.  Neck: No tenderness to palpitation.  Full range of motion to flexion and extension, lateral rotation and lateral flexion of cervical spine.  No JVD. Supple.   Lungs: Clear to auscultation bilaterally.  Cardiac: Regular rate and rhythm. No murmur.  Abdomen:  Soft, non-distended, non-tender, with no rebound or guarding.  No peritoneal signs. No ascites.  Liver is within normal limits.  Spleen is not palpable.    Extremities: right knee painful ROM.  Positive crepitus.  ROM  0-110.    Skin: Normal texture and turgor.  Lymphatic:  No palpable cervical lymphadenopathy.  Neurologic: Cranial nerves are grossly intact.  Motor strength and sensory examination is grossly normal.  No focal neurologic deficit.    Laboratory Data:      Impression and Plan:  Patient Active Problem List   Diagnosis    TIA (transient ischemic attack)    Spinal stenosis    Osteoarthritis of left knee    Dyslipidemia    SOB (shortness of breath)    Nonrheumatic aortic valve stenosis    Osteoarthritis of right knee       Right knee total knee arthroplasty      Mario Lozada MD  3/21/2024  10:59 AM

## 2024-03-21 NOTE — ANESTHESIA PREPROCEDURE EVALUATION
Anesthesia PreOp Note    HPI:     Jorge Luis Bautista is a 68 year old male who presents for preoperative consultation requested by: Mario Lozada MD    Date of Surgery: 3/21/2024    Procedure(s):  Right total knee arthroplasty  Indication: Osteoarthritis    Relevant Problems   No relevant active problems       NPO:  Last Liquid Consumption Date: 03/20/24  Last Liquid Consumption Time: 2030  Last Solid Consumption Date: 03/20/24  Last Solid Consumption Time: 2030  Last Liquid Consumption Date: 03/20/24          History Review:  Patient Active Problem List    Diagnosis Date Noted    Nonrheumatic aortic valve stenosis 07/24/2020    SOB (shortness of breath)     Osteoarthritis of left knee 10/20/2016    Dyslipidemia 10/20/2016    TIA (transient ischemic attack)     Spinal stenosis        Past Medical History:   Diagnosis Date    ALLERGIC RHINITIS     Allergic rhinitis, cause unspecified     Cancer (HCC)     SKIN     Cataract     Disorder of thyroid     High cholesterol     History of blood transfusion     1973    Osteoarthritis     PONV (postoperative nausea and vomiting)     Spinal stenosis     Stroke (HCC)     TIA 2012 2013    TIA (transient ischemic attack)        Past Surgical History:   Procedure Laterality Date    ARTHROSCOPY, KNEE, SURGICAL FOR IMPLANTATION OF CULTURED ANALOGOUS CHONDROCYTES      torn meniscus    CHOLECYSTECTOMY      COLONOSCOPY  07/2015    osis    HAND TENDON PULLEY RECONST,GRAFT Left     OTHER SURGICAL HISTORY Right     Hand carpal tunnel release    OTHER SURGICAL HISTORY      Heart valve aortic  replacement    REMOVAL GALLBLADDER      REPAIR ROTATOR CUFF,ACUTE Right     REPR HAND/FOOT NERVE,MEDIAN MOTOR      in 1970's    SKIN SURGERY  11/01/2018    Exc of BCC to right medial chest    SKIN SURGERY  05/28/2020    BCC to Left Chest WLE by AP     TOTAL KNEE REPLACEMENT Left 10/20/2016    Dr. Amezquita       Medications Prior to Admission   Medication Sig Dispense Refill Last Dose     cholecalciferol 50 MCG (2000 UT) Oral Tab Take 1 tablet (2,000 Units total) by mouth daily.   3/20/2024    cromolyn 5.2 MG/ACT Nasal Aero Soln 1 spray by Nasal route in the morning and 1 spray before bedtime.   3/20/2024    METRONIDAZOLE 1 % External Gel Apply to face once to twice daily. (Patient taking differently: daily. Apply to face once to twice daily.) 60 g 0 3/21/2024    levothyroxine 50 MCG Oral Tab Take 1 tablet (50 mcg total) by mouth before breakfast.   3/21/2024    Aspirin 81 MG Oral Cap Take 81 mg by mouth daily. He stop it on March 15,2024   3/15/2024    Omega-3 Fatty Acids (FISH OIL) 1200 MG Oral Cap Take 1 tablet by mouth daily.   3/18/2024    Atorvastatin Calcium 20 MG Oral Tab Take 1 tablet (20 mg total) by mouth nightly.   3/20/2024    cetirizine 10 MG Oral Tab Take 1 tablet (10 mg total) by mouth daily.   3/21/2024    DiphenhydrAMINE HCl, Sleep, (SLEEP AID OR) Take 1 tablet by mouth nightly as needed.   3/20/2024    OCUVITE-LUTEIN OR CAPS Take 1 tablet by mouth daily.   3/18/2024    CENTRUM SILVER OR Take 1 tablet by mouth daily.   3/20/2024     Current Facility-Administered Medications Ordered in Epic   Medication Dose Route Frequency Provider Last Rate Last Admin    lactated ringers infusion   Intravenous Continuous Mario Lozada MD 20 mL/hr at 03/21/24 0950 New Bag at 03/21/24 0950    ceFAZolin (Ancef) 2 g in 20mL IV syringe premix  2 g Intravenous Once Mario Lozada MD         No current [facility]-ordered outpatient medications on file.       Allergies   Allergen Reactions    Bacitracin HIVES       Family History   Problem Relation Age of Onset    Cancer Father         larynx, lung in 86    Heart Disorder Father         aortic annyerysm    Other (Other) Father         macular degenation     Heart Disorder Mother         pacemaker- enlarged heart    Other (Other) Mother         smoker 60+ years  emphysema now 86 yo     Social History     Socioeconomic History    Marital status:     Tobacco Use    Smoking status: Never    Smokeless tobacco: Never   Vaping Use    Vaping Use: Never used   Substance and Sexual Activity    Alcohol use: Yes     Comment: social    Drug use: No       Available pre-op labs reviewed.  Lab Results   Component Value Date    WBC 8.3 03/18/2024    RBC 4.92 03/18/2024    HGB 14.5 03/18/2024    HCT 44.6 03/18/2024    MCV 90.7 03/18/2024    MCH 29.5 03/18/2024    MCHC 32.5 03/18/2024    RDW 12.4 03/18/2024    .0 03/18/2024     Lab Results   Component Value Date     03/18/2024    K 4.1 03/18/2024     03/18/2024    CO2 24.0 03/18/2024    BUN 17 03/18/2024    CREATSERUM 1.29 03/18/2024    GLU 87 03/18/2024    CA 9.5 03/18/2024          Vital Signs:  Body mass index is 29.09 kg/m².   height is 1.93 m (6' 4\") and weight is 108.4 kg (239 lb). His oral temperature is 97.8 °F (36.6 °C). His blood pressure is 114/72 and his pulse is 81. His respiration is 16 and oxygen saturation is 96%.   Vitals:    03/18/24 0957 03/21/24 0927   BP:  114/72   Pulse:  81   Resp:  16   Temp:  97.8 °F (36.6 °C)   TempSrc:  Oral   SpO2:  96%   Weight: 108 kg (238 lb) 108.4 kg (239 lb)   Height: 1.93 m (6' 4\")         Anesthesia Evaluation     Patient summary reviewed and Nursing notes reviewed    No history of anesthetic complications   Airway   Mallampati: II  TM distance: >3 FB  Neck ROM: full  Dental      Pulmonary - negative ROS    breath sounds clear to auscultation  (-) COPD, asthma, sleep apnea  Cardiovascular   (+) valvular problems/murmurs AS  (-) hypertension, CAD, CHF    Rhythm: regular  Rate: normal  ROS comment: Severe aortic stenosis s/p TAVR    Neuro/Psych    (+)  TIA,      (-) CVA    GI/Hepatic/Renal - negative ROS   (-) GERD, liver disease, renal disease    Endo/Other    (+) arthritis  (-) diabetes mellitus, hypothyroidism  Abdominal                  Anesthesia Plan:   ASA:  3  Plan:   Spinal  Post-op Pain Management: Oral pain medication, IV analgesics and  Intrathecal narcotics  Informed Consent Plan and Risks Discussed With:  Patient  Consent Comment: Risks of spinal including infection, hematoma, nerve damage and risks of GA including N/V, sore throat, respiratory/cardiac compromise explained to pt and pt voiced understanding; all questions answered.          I have informed Jorge Luis Bautista and/or legal guardian or family member of the nature of the anesthetic plan, benefits, risks including possible dental damage if relevant, major complications, and any alternative forms of anesthetic management.   All of the patient's questions were answered to the best of my ability. The patient desires the anesthetic management as planned.  Glenroy Fajardo MD  3/21/2024 9:58 AM  Present on Admission:  **None**

## 2024-03-22 PROBLEM — Z01.818 PREOP TESTING: Status: ACTIVE | Noted: 2024-03-22

## 2024-03-22 LAB
HCT VFR BLD AUTO: 37.2 %
HGB BLD-MCNC: 12.7 G/DL

## 2024-03-22 PROCEDURE — 99214 OFFICE O/P EST MOD 30 MIN: CPT | Performed by: HOSPITALIST

## 2024-03-22 RX ORDER — HYDROCODONE BITARTRATE AND ACETAMINOPHEN 10; 325 MG/1; MG/1
1 TABLET ORAL EVERY 4 HOURS PRN
Status: DISCONTINUED | OUTPATIENT
Start: 2024-03-22 | End: 2024-03-23

## 2024-03-22 RX ORDER — HYDROCODONE BITARTRATE AND ACETAMINOPHEN 10; 325 MG/1; MG/1
2 TABLET ORAL EVERY 6 HOURS PRN
Status: DISCONTINUED | OUTPATIENT
Start: 2024-03-22 | End: 2024-03-23

## 2024-03-22 RX ORDER — HYDROCODONE BITARTRATE AND ACETAMINOPHEN 10; 325 MG/1; MG/1
1-2 TABLET ORAL EVERY 6 HOURS PRN
Qty: 40 TABLET | Refills: 0 | Status: SHIPPED | OUTPATIENT
Start: 2024-03-22

## 2024-03-22 NOTE — PHYSICAL THERAPY NOTE
PHYSICAL THERAPY KNEE TREATMENT NOTE - INPATIENT     Room Number: Room 4/Room 4-A             Presenting Problem: R TKR  Co-Morbidities : Aortic valve replacement, hypothyroism, hand tendon release procedures with B hand deformity, TIA, SOB    Problem List  Principal Problem:    Primary osteoarthritis of right knee  Active Problems:    Dyslipidemia    Hypothyroidism      PHYSICAL THERAPY ASSESSMENT   Patient demonstrates limited progress this session, goals  remain in progress.    Patient continues to function below baseline with bed mobility, transfers, gait, stair negotiation, maintaining seated position, standing prolonged periods, and performing household tasks.  Contributing factors to remaining limitations include decreased functional strength, decreased endurance/aerobic capacity, pain, decreased muscular endurance, medical status, and limited RLE ROM.  Next session anticipate patient to progress bed mobility, transfers, gait, stair negotiation, maintaining seated position, standing prolonged periods, and performing household tasks.  Physical Therapy will continue to follow patient for duration of hospitalization.    Patient continues to benefit from continued skilled PT services: at discharge to promote functional independence and safety with additional support and return home with home health PT.    PLAN  PT Treatment Plan: Bed mobility;Body mechanics;Endurance;Energy conservation;Patient education;Gait training;Strengthening;Transfer training;Balance training;Stoop training;Stair training  Frequency (Obs): Daily    SUBJECTIVE  I feel ok just sore with walking but I am ready to try the stairs.     OBJECTIVE  Precautions: Limb alert - right    WEIGHT BEARING STATUS        R Lower Extremity: Weight Bearing as Tolerated       PAIN ASSESSMENT   Ratin  Location: R knee  Management Techniques: Activity promotion;Body mechanics;Repositioning;Relaxation;Breathing techniques    BALANCE  Static Sitting:  Good  Dynamic Sitting: Fair +  Static Standing: Fair -  Dynamic Standing: Fair -    ACTIVITY TOLERANCE  Pulse: 80  Heart Rate Source: Monitor  Resp: 16  BP: 115/69  BP Location: Left arm  BP Method: Automatic  Patient Position: Sitting    O2 WALK  Oxygen Therapy  SPO2% on Oxygen at Rest: 94  SPO2% Ambulation on Oxygen: 82    AM-PAC '6-Clicks' INPATIENT SHORT FORM - BASIC MOBILITY  How much difficulty does the patient currently have...  Patient Difficulty: Turning over in bed (including adjusting bedclothes, sheets and blankets)?: None   Patient Difficulty: Sitting down on and standing up from a chair with arms (e.g., wheelchair, bedside commode, etc.): None   Patient Difficulty: Moving from lying on back to sitting on the side of the bed?: None   How much help from another person does the patient currently need...   Help from Another: Moving to and from a bed to a chair (including a wheelchair)?: A Little   Help from Another: Need to walk in hospital room?: A Little   Help from Another: Climbing 3-5 steps with a railing?: A Little     AM-PAC Score:  Raw Score: 21   Approx Degree of Impairment: 28.97%   Standardized Score (AM-PAC Scale): 50.25   CMS Modifier (G-Code): CJ    FUNCTIONAL ABILITY STATUS  Functional Mobility/Gait Assessment  Gait Assistance: Supervision  Distance (ft): 150  Assistive Device: Rolling walker  Pattern: R Decreased stance time (step to gait)  Stairs: Stairs;Stoop/curb  How Many Stairs: 5  Device: 2 Rails  Assist: Supervision  Pattern: Ascend and Descend  Ascend and Descend : Step to  Rolling: stand-by assist  Supine to Sit: stand-by assist  Sit to Supine: contact guard assist  Sit to Stand: supervision    Additional Information: PT ed with bed mobility with min A to help with RLE in and out of bed. Pt ed with SBA for gait progression amb 150' with a step through gait pattern with SBA. Pt ed with stair mobility training up / down 5 steps with 2 SR support and SBA. Pt desaturated to 82% on room  air with 02 sats and returned to 90% once back in bed. Pt requires VC's for energy conservation pursed lip breathing.     The patient's Approx Degree of Impairment: 28.97% has been calculated based on documentation in the [facility] '6 clicks' Inpatient Basic Mobility Short Form.  Research supports that patients with this level of impairment may benefit from [facility] PT with spouse assist.  Final disposition will be made by interdisciplinary medical team.    Knee ROM   R Knee Flexion (degrees): 55 (guarded)     R Knee Extension (degrees): 0       Patient End of Session: In bed;With  staff;Needs met;Call light within reach;RN aware of session/findings;All patient questions and concerns addressed;Ice applied    CURRENT GOALS  Goals to be met by: 3/30/2024  Patient Goal Patient's self-stated goal is: Return home    Goal #1 Patient is able to demonstrate supine - sit EOB @ level: modified independent      Goal #1   Current Status  SBA    Goal #2 Patient is able to demonstrate transfers Sit to/from Stand at assistance level: modified independent      Goal #2  Current Status  SBA with RW   Goal #3 Patient is able to ambulate 300 feet with assistive device at assistance level: modified independent    Goal #3   Current Status  SBA with ' advanced to step through gait   Goal #4 Patient will negotiate 14 stairs/one curb w/ assistive device and supervision   Goal #4   Current Status  SBA with 2 SR support 5 steps with SBA   Goal #5  AROM 0 degrees extension to 95 degrees flexion     Goal #5   Current Status  0-55 R knee (guarded needs work)   Goal #6 Patient independently performs home exercise program for ROM/strengthening per the instructions provided in preparation for discharge.   Goal #6  Current Status  ongoing     Gait Training: 15 minutes

## 2024-03-22 NOTE — OCCUPATIONAL THERAPY NOTE
OCCUPATIONAL THERAPY EVALUATION - INPATIENT     Room Number: Room 4/Room 4-A  Evaluation Date: 3/22/2024  Type of Evaluation: Initial  Presenting Problem: s/p R TKA    Physician Order: IP Consult to Occupational Therapy  Reason for Therapy: ADL/IADL Dysfunction and Discharge Planning    OCCUPATIONAL THERAPY ASSESSMENT   Patient is a 68 year old male admitted 3/21/2024 for R TKA.  Prior to admission, patient's baseline is independent -- retired, likes to garden, goes to the gym 3x/wk, drives.  Patient is currently functioning below baseline with ADLs and functional mobility.  Patient is requiring CGA - Min A as a result of the following impairments: limited reach , pain, decreased standing balance. Occupational Therapy will continue to follow for duration of hospitalization.    Do not anticipate OT needs at discharge. Anticipate -- home with family support as needed.     PLAN  OT Treatment Plan: Balance activities;Energy conservation/work simplification techniques;ADL training;Functional transfer training;Endurance training;Equipment eval/education;Compensatory technique education  OT Device Recommendations: None    OCCUPATIONAL THERAPY MEDICAL/SOCIAL HISTORY   Problem List   Principal Problem:    Primary osteoarthritis of right knee  Active Problems:    Dyslipidemia    Hypothyroidism    HOME SITUATION  Type of Home: House  Home Layout: Two level; Other (Comment) (bathroom on main level)  Lives With: Spouse  Toilet and Equipment: Comfort height toilet; Toilet riser  Shower/Tub and Equipment: Tub-shower combo  Occupation/Status: retired educator, special education  Drives: Yes    Prior Level of [place]: independent     SUBJECTIVE  \"I like to stay active!\"  Pt is a retired educator , has supportive spouse    OCCUPATIONAL THERAPY EXAMINATION   OBJECTIVE  Fall Risk: Standard fall risk    WEIGHT BEARING RESTRICTION     PAIN ASSESSMENT  Ratin    ACTIVITY TOLERANCE  Room air  Denied  dizziness    COGNITION  WFL    RANGE OF MOTION   Upper extremity ROM is within functional limits     STRENGTH ASSESSMENT  Upper extremity strength is within functional limits     ACTIVITIES OF DAILY LIVING ASSESSMENT  AM-PAC ‘6-Clicks’ Inpatient Daily Activity Short Form  How much help from another person does the patient currently need…  -   Putting on and taking off regular lower body clothing?: A Little  -   Bathing (including washing, rinsing, drying)?: A Little  -   Toileting, which includes using toilet, bedpan or urinal? : A Little  -   Putting on and taking off regular upper body clothing?: A Little  -   Taking care of personal grooming such as brushing teeth?: A Little  -   Eating meals?: None    AM-PAC Score:  Score: 19  Approx Degree of Impairment: 42.8%  Standardized Score (AM-PAC Scale): 40.22  CMS Modifier (G-Code): CK    FUNCTIONAL TRANSFER ASSESSMENT  Sit to Stand: Chair  Chair: Contact Guard Assist (slight posterior loss of balance with initial stand to pull underwear up)    BED MOBILITY  Sit to Supine (OT): Minimal Assist    FUNCTIONAL ADL ASSESSMENT  LB Dressing Seated: Minimal Assist (limited reach , RLE)  LB Dressing Standing: Contact Guard Assist (slight loss of balance upon standing while attempting to pull up underwear)    Skilled Therapy Provided:  RN cleared pt for participation in occupational therapy session. Upon arrival, pt was seated in bedside chair -- reported discomfort -- legs longer than the foot rest and wanting to return to bed at end of session= and agreeable to activity. No family was present during session. Pt benefited from additional time, verbal cues, RW to maximize participation. Pt with slight lob posteriorly upon standing to pull up underwear.       EDUCATION PROVIDED  Pt was instructed on plan of care, bed mobility, transfers (hand and LE placement during transitional movements), car transfers and LE dressing strategies.    Patient: Role of Occupational Therapy; Plan  of Care; Functional Transfer Techniques; Compensatory ADL Techniques  Patient's Response to Education: Verbalized Understanding; Returned Demonstration    Patient End of Session: In bed;Needs met;Call light within reach;RN aware of session/findings    OT Goals  Patient self-stated goal is: home     Patient will complete LE dressing with Mod I  Comment:     Patient will complete toilet transfer with Mod I  Comment:     Patient will complete self care task at sink level with Mod I   Comment:     Comment:         Goals  on: 24  Frequency:1-2 additional sessions     Patient Evaluation Complexity Level:   Occupational Profile/Medical History LOW - Brief history including review of medical or therapy records    Specific performance deficits impacting engagement in ADL/IADL LOW  1 - 3 performance deficits    Client Assessment/Performance Deficits LOW - No comorbidities nor modifications of tasks    Clinical Decision Making LOW - Analysis of occupational profile, problem-focused assessments, limited treatment options    Overall Complexity LOW     OT Session Time: 25 minutes  Self-Care Home Management: 25 minutes

## 2024-03-22 NOTE — DISCHARGE INSTRUCTIONS
Follow up 2 weeks postop.  Keep wound dry.  Xarelto for 3 weeks postop. Pt. May substitute aspirin 325mg BID for xarelto. Full weight bearing.  Norco for pain     Home health  [facility] [phone]

## 2024-03-22 NOTE — ANESTHESIA POST-OP FOLLOW-UP NOTE
Post Duramorph and Block Rounds    Jorge Luis Bautista is a postoperative day [unfilled] for Right total knee arthroplasty on @[facility]@.    Time of block placement: 1117    Jorge Luis Bautista has pruritis and no complaints.  He rates his pain at 4 out of 10.  There are no signs or symptoms of systemic local anesthetic toxicity.      Vitals: [unfilled]  Temp (24hrs), Av.8 °F (36.6 °C), Min:97 °F (36.1 °C), Max:98.6 °F (37 °C)      Labs: @ANLASTLAB(WBC:3,PLT:3,INR:3,aPTT:3)@       Assessment and Plan: Jorge Luis Bautista's block has resolved.. Plan transition to oral pain medications ordered by surgeon. No further anesthesia follow up is required.

## 2024-03-22 NOTE — CM/SW NOTE
SW received MDO for Home Health     SW received email from [facility] stating pt is pre ortho arranged with [facility]/ [facility] [phone]   prior to surgery by the surgeon's office.     PT/OT recommendations are Home Health PT/OT      [facility] will follow up post op to discuss SOC      Clinicals/ HHC/ F2F sent in aidin      PLAN: Home with [facility] pending med clear    Fe Stein, LSW, MSW ext. [phone]

## 2024-03-22 NOTE — PROGRESS NOTES
[facility]     Jorge Luis Bautista Patient Status:  Outpatient in a Bed    10/2/1955  68 year old CSN [id number]   Location Room 4/Room 4-A Attending Mario Lozada MD   Hosp Day # 0 PCP TITI VARGAS MD     Assessment & Plan:   ----------------------------------  1.       Right knee primary osteoarthritis, status post right total knee arthroplasty.  Spinal block.  Pain control.  Neurovascular checks.  Xarelto for DVT prophylaxis.  Monitor surgical wound and drain.  Physical and occupational therapy.  2.       Hyperlipidemia.  Continue home medications.  3.       Hypothyroidism.  Continue home medications.    Home tomorrow      Subjective:   ----------------------------------  Pain controlled.  Nausea yesterday but now resolved.  Working with physical therapy.  No chest pain or shortness of breath.      Objective:   Chief Complaint: No chief complaint on file.    ----------------------------------  Temp:  [97.7 °F (36.5 °C)-98.6 °F (37 °C)] 98.6 °F (37 °C)  Pulse:  [65-82] 80  Resp:  [16-18] 16  BP: (111-132)/(61-87) 115/69  SpO2:  [94 %-96 %] 94 %  Gen: A+Ox3.  No distress.   HEENT: NCAT, neck supple, no carotid bruit.  CV: RRR, S1S2, and intact distal pulses. No gallop, rub, murmur.  Pulm: Effort and breath sounds normal. No distress, wheezes, rales, rhonchi.  Abd: Soft, NTND, BS normal, no mass, no HSM, no rebound/guarding.   Neuro: Normal reflexes, CN. Sensory/motor exams grossly normal deficit.   MS: No joint effusions.  No peripheral edema.  Drain out, incision clean dry and intact.  Skin: Skin is warm and dry. No rashes, erythema, diaphoresis.   Psych: Normal mood and affect. Calm, cooperative    Labs:  Lab Results   Component Value Date    HGB 12.7 (L) 2024    WBC 8.3 2024    .0 2024     2024    K 4.1 2024    CREATSERUM 1.29 2024    INR 0.87 2023    BILIRUBTOTAL 0.6 2006     AST 34 08/06/2020    ALT 47 08/06/2020          atorvastatin  20 mg Oral Nightly    cetirizine  10 mg Oral Daily    levothyroxine  50 mcg Oral Before breakfast    sennosides  17.2 mg Oral Nightly    docusate sodium  100 mg Oral BID    rivaroxaban  10 mg Oral Q24H     HYDROcodone-acetaminophen, HYDROcodone-acetaminophen, sodium chloride, polyethylene glycol (PEG 3350), magnesium hydroxide, bisacodyl, fleet enema, ondansetron, metoclopramide, diphenhydrAMINE **OR** diphenhydrAMINE, oxyCODONE **OR** oxyCODONE, HYDROmorphone **OR** HYDROmorphone, cyclobenzaprine

## 2024-03-22 NOTE — PROGRESS NOTES
[facility]  part of [facility]    Progress Note    Jorge Luis Bautista Patient Status:  Outpatient in a Bed    10/2/1955 MRN [id number]   Location [facility] Attending Mario Lozada MD   Hosp Day # 0 PCP TITI VARGAS MD     Subjective:  Jorge Luis Bautista is a(n) 68 year old male. Pt doing well    Objective/Physical Exam:    Vital Signs:  Blood pressure 115/69, pulse 80, temperature 98.6 °F (37 °C), temperature source Oral, resp. rate 16, height 6' 4\" (1.93 m), weight 239 lb (108.4 kg), SpO2 94%.     Extremities: Wound Clean, Dry and Intact No signs of infection  Motor intact  No calf tenderness       Labs:  Lab Results   Component Value Date    HGB 12.7 2024    HCT 37.2 2024        Assessment/Plan:  Patient Active Problem List   Diagnosis    TIA (transient ischemic attack)    Spinal stenosis    Osteoarthritis of left knee    Dyslipidemia    SOB (shortness of breath)    Nonrheumatic aortic valve stenosis    Primary osteoarthritis of right knee    Hypothyroidism       S/p right TKA.  Drain removed.  Up with therapy.  Discharge tomorrow. Follow up 2 weeks postop.  Keep wound dry.  Xarelto for 3 weeks postop. Pt. May substitute aspirin 325mg BID for xarelto. Full weight bearing.  Norco for pain         Mario Lozada MD  3/22/2024  9:13 AM

## 2024-03-22 NOTE — PHYSICAL THERAPY NOTE
PHYSICAL THERAPY KNEE EVALUATION - INPATIENT      Room Number: Room 4/Room 4-A  Evaluation Date: 3/22/2024  Type of Evaluation: Initial  Physician Order: PT Eval and Treat    Presenting Problem: R TKR  Co-Morbidities : Aortic valve replacement, hypothyroism, hand tendon release procedures with B hand deformity, TIA, SOB  Reason for Therapy: Mobility Dysfunction and Discharge Planning    PHYSICAL THERAPY ASSESSMENT   Patient is a 68 year old male admitted 3/21/2024 for R TKR WBAT.  Prior to admission, patient's baseline is indep with a ll mobility and ADL's lives with spouse.  Patient is currently functioning below baseline with bed mobility, transfers, gait, stair negotiation, maintaining seated position, standing prolonged periods, and performing household tasks.  Patient is requiring minimal assist as a result of the following impairments: decreased functional strength, decreased endurance/aerobic capacity, pain, impaired standing balance, decreased muscular endurance, medical status, and limited R knee ROM.  Physical Therapy will continue to follow for duration of hospitalization.    Patient will benefit from continued skilled PT Services at discharge to promote functional independence and safety with additional support and return home with home health PT.    PLAN  PT Treatment Plan: Bed mobility;Body mechanics;Endurance;Energy conservation;Patient education;Gait training;Range of motion;Strengthening;Transfer training;Balance training;Stoop training  Rehab Potential : Good  Frequency (Obs): BID    PHYSICAL THERAPY MEDICAL/SOCIAL HISTORY   History related to current admission: R TKR     Problem List  Principal Problem:    Primary osteoarthritis of right knee  Active Problems:    Dyslipidemia    Hypothyroidism      HOME SITUATION  Home Situation  Type of Home: House  Home Layout: Two level;Other (Comment) (bathroom on main level)  Stairs to Enter : 2  Railing: Yes  Stairs to Bedroom: 14  Railing: Yes  Lives With:  Spouse  Drives: Yes  Patient Owned Equipment: Rolling walker     Prior Level of [place]: Pt lives with supportive spouse owns RW, indep with all mobility and ADL's.     SUBJECTIVE  I feel ok just real sore with walking but I can do it. I did not sleep much at all last night so I am looking forward to getting some rest after my PT session.     PHYSICAL THERAPY EXAMINATION   OBJECTIVE  Precautions: Limb alert - right  Fall Risk: Standard fall risk    WEIGHT BEARING RESTRICTION  Weight Bearing Restriction: R lower extremity        R Lower Extremity: Weight Bearing as Tolerated       PAIN ASSESSMENT  Ratin  Location: R knee  Management Techniques: Activity promotion;Body mechanics;Breathing techniques;Relaxation;Repositioning    COGNITION  Overall Cognitive Status:  WFL - within functional limits    RANGE OF MOTION AND STRENGTH ASSESSMENT  Upper extremity ROM and strength are within functional limits   Lower extremity ROM is within functional limits   Lower extremity strength is within functional limits     BALANCE  Static Sitting: Good  Dynamic Sitting: Fair +  Static Standing: Fair -  Dynamic Standing: Fair -                                                                       ADDITIONAL TESTS  Knee ROM: R knee extension;R knee flexion  R Knee Flexion (degrees): 50     R Knee Extension (degrees): 0                                       ACTIVITY TOLERANCE  Pulse: 82  Heart Rate Source: Monitor  Resp: 16  BP: 115/69  BP Location: Right leg  BP Method: Automatic  Patient Position: Sitting    O2 WALK  Oxygen Therapy  SPO2% Ambulation on Oxygen: 98    AM-PAC '6-Clicks' INPATIENT SHORT FORM - BASIC MOBILITY  How much difficulty does the patient currently have...  Patient Difficulty: Turning over in bed (including adjusting bedclothes, sheets and blankets)?: None   Patient Difficulty: Sitting down on and standing up from a chair with arms (e.g., wheelchair, bedside commode, etc.): None   Patient Difficulty: Moving  from lying on back to sitting on the side of the bed?: A Little   How much help from another person does the patient currently need...   Help from Another: Moving to and from a bed to a chair (including a wheelchair)?: A Little   Help from Another: Need to walk in hospital room?: A Little   Help from Another: Climbing 3-5 steps with a railing?: A Little     AM-PAC Score:  Raw Score: 20   Approx Degree of Impairment: 35.83%   Standardized Score (AM-PAC Scale): 47.67   CMS Modifier (G-Code): CJ     FUNCTIONAL ABILITY STATUS  Functional Mobility/Gait Assessment  Gait Assistance: Supervision  Distance (ft): 150  Assistive Device: Rolling walker  Pattern: R Decreased stance time (step to gait advanced to step through gait)  Rolling: minimal assist  Supine to Sit: minimal assist  Sit to Supine: stand-by assist  Sit to Stand: stand-by assist    Exercise/Education Provided:  Bed mobility  Body mechanics  Energy conservation  Functional activity tolerated  Gait training  Posture  Strengthening  Lower therapeutic exercise:  Ankle pumps  Heel slides  LAQ  Transfer training    The patient's Approx Degree of Impairment: 35.83% has been calculated based on documentation in the [facility] '6 clicks' Inpatient Basic Mobility Short Form.  Research supports that patients with this level of impairment may benefit from [facility] PT spouse assist.  Final disposition will be made by interdisciplinary medical team.    Patient End of Session: Up in chair;Needs met;Call light within reach;RN aware of session/findings;All patient questions and concerns addressed;Ice applied    CURRENT GOALS  Goals to be met by: 3/30/2024  Patient Goal Patient's self-stated goal is: Return home    Goal #1 Patient is able to demonstrate supine - sit EOB @ level: modified independent     Goal #1   Current Status    Goal #2 Patient is able to demonstrate transfers Sit to/from Stand at assistance level: modified independent     Goal #2  Current Status    Goal #3 Patient is  able to ambulate 300 feet with assistive device at assistance level: modified independent    Goal #3   Current Status    Goal #4 Patient will negotiate 14 stairs/one curb w/ assistive device and supervision   Goal #4   Current Status    Goal #5  AROM 0 degrees extension to 95 degrees flexion     Goal #5   Current Status    Goal #6 Patient independently performs home exercise program for ROM/strengthening per the instructions provided in preparation for discharge.   Goal #6  Current Status      Patient Evaluation Complexity Level:  History Low - no personal factors and/or co-morbidities   Examination of body systems Low -  addressing 1-2 elements   Clinical Presentation Low- Stable   Clinical Decision Making  Low Complexity     Gait Training: 15 minutes

## 2024-03-23 VITALS
HEIGHT: 76 IN | HEART RATE: 91 BPM | BODY MASS INDEX: 29.1 KG/M2 | WEIGHT: 239 LBS | DIASTOLIC BLOOD PRESSURE: 69 MMHG | OXYGEN SATURATION: 94 % | RESPIRATION RATE: 18 BRPM | TEMPERATURE: 99 F | SYSTOLIC BLOOD PRESSURE: 133 MMHG

## 2024-03-23 PROCEDURE — 99214 OFFICE O/P EST MOD 30 MIN: CPT | Performed by: HOSPITALIST

## 2024-03-23 NOTE — PROGRESS NOTES
[facility]  part of [facility]    Progress Note    Jorge Luis Bautista Patient Status:  Outpatient in a Bed    10/2/1955 MRN [id number]   Location [facility] Attending Scooter Trejo MD   Hosp Day # 0 PCP TITI VARGAS MD     Date of Admission:  3/21/2024    SUBJECTIVE:   Jorge Luis Bautista  Laying comfortably in bed.  Pain well-controlled at this time.    REVIEW OF SYSTEMS:   A twelve point review of systems was performed as documented on the intake form and reviewed by me today with pertinent positives and negatives listed in the HPI.  GENERAL HEALTH: denies weight loss of significance, denies fatigue nor fever  HENT: denies blurry vision, double vision, hearing ailments, dysphagia, odynophagia  SKIN: denies any unusual skin lesions or rashes  RESPIRATORY: denies shortness of breath with exertion  CARDIOVASCULAR: denies chest pain on exertion  GI: denies abdominal pain and denies heartburn  EXTREMITIES: denies numbness, tingling, or weakness  NEURO: denies headaches.    ALLERGIES:  Allergies   Allergen Reactions    Bacitracin HIVES       Meds This Visit:    HYDROcodone-acetaminophen (Norco)  MG per tab 1 tablet  1 tablet Oral Q4H PRN    HYDROcodone-acetaminophen (Norco)  MG per tab 2 tablet  2 tablet Oral Q6H PRN    lactated ringers infusion   Intravenous Continuous    [COMPLETED] acetaminophen (Tylenol Extra Strength) tab 1,000 mg  1,000 mg Oral Once    [COMPLETED] tranexamic acid (Lysteda) tab 1,300 mg  1,300 mg Oral Once    [COMPLETED] ceFAZolin (Ancef) 2 g in 20mL IV syringe premix  2 g Intravenous Once    atorvastatin (Lipitor) tab 20 mg  20 mg Oral Nightly    cetirizine (ZyrTEC) tab 10 mg  10 mg Oral Daily    levothyroxine (Synthroid) tab 50 mcg  50 mcg Oral Before breakfast    [] ondansetron (Zofran) 4 MG/2ML injection 4 mg  4 mg Intravenous Once PRN    [] prochlorperazine (Compazine) 10 MG/2ML injection 5 mg  5 mg Intravenous Once PRN     [] haloperidol lactate (Haldol) 5 MG/ML injection 0.5 mg  0.5 mg Intravenous Once PRN    [] sodium chloride 0.9 % IV bolus 500 mL  500 mL Intravenous Once PRN    sennosides (Senokot) tab 17.2 mg  17.2 mg Oral Nightly    docusate sodium (Colace) cap 100 mg  100 mg Oral BID    polyethylene glycol (PEG 3350) (Miralax) 17 g oral packet 17 g  17 g Oral Daily PRN    magnesium hydroxide (Milk of Magnesia) 400 MG/5ML oral suspension 30 mL  30 mL Oral Daily PRN    bisacodyl (Dulcolax) 10 MG rectal suppository 10 mg  10 mg Rectal Daily PRN    fleet enema (Fleet) 7-19 GM/118ML rectal enema 133 mL  1 enema Rectal Once PRN    ondansetron (Zofran) 4 MG/2ML injection 4 mg  4 mg Intravenous Q6H PRN    metoclopramide (Reglan) 5 mg/mL injection 10 mg  10 mg Intravenous Q8H PRN    [] diphenhydrAMINE (Benadryl) 50 mg/mL  injection 25 mg  25 mg Intravenous Once PRN    diphenhydrAMINE (Benadryl) cap/tab 25 mg  25 mg Oral Q4H PRN    Or    diphenhydrAMINE (Benadryl) 50 mg/mL  injection 12.5 mg  12.5 mg Intravenous Q4H PRN    rivaroxaban (Xarelto) tab 10 mg  10 mg Oral Q24H    oxyCODONE immediate release tab 2.5 mg  2.5 mg Oral Q4H PRN    Or    oxyCODONE immediate release tab 5 mg  5 mg Oral Q4H PRN    HYDROmorphone (Dilaudid) 1 MG/ML injection 0.2 mg  0.2 mg Intravenous Q2H PRN    Or    HYDROmorphone (Dilaudid) 1 MG/ML injection 0.4 mg  0.4 mg Intravenous Q2H PRN    cyclobenzaprine (Flexeril) tab 5 mg  5 mg Oral Q8H PRN    [COMPLETED] ceFAZolin (Ancef) 2 g in 20mL IV syringe premix  2 g Intravenous Q8H       HISTORY:  Past Medical History:  Past Medical History:   Diagnosis Date    ALLERGIC RHINITIS     Allergic rhinitis, cause unspecified     Cancer (HCC)     SKIN     Cataract     Disorder of thyroid     High cholesterol     History of blood transfusion     1973    Osteoarthritis     PONV (postoperative nausea and vomiting)     Spinal stenosis     Stroke (HCC)     TIA 2012    TIA (transient ischemic attack)        Surgical History:  Past Surgical History:   Procedure Laterality Date    ARTHROSCOPY, KNEE, SURGICAL FOR IMPLANTATION OF CULTURED ANALOGOUS CHONDROCYTES      torn meniscus    CHOLECYSTECTOMY      COLONOSCOPY  07/2015    osis    HAND TENDON PULLEY RECONST,GRAFT Left     OTHER SURGICAL HISTORY Right     Hand carpal tunnel release    OTHER SURGICAL HISTORY      Heart valve aortic  replacement    REMOVAL GALLBLADDER      REPAIR ROTATOR CUFF,ACUTE Right     REPR HAND/FOOT NERVE,MEDIAN MOTOR      in 1970's    SKIN SURGERY  11/01/2018    Exc of BCC to right medial chest    SKIN SURGERY  05/28/2020    BCC to Left Chest WLE by AP     TOTAL KNEE REPLACEMENT Left 10/20/2016    Dr. Amezquita      Social History:  Social History     Socioeconomic History    Marital status:    Tobacco Use    Smoking status: Never    Smokeless tobacco: Never   Vaping Use    Vaping Use: Never used   Substance and Sexual Activity    Alcohol use: Yes     Comment: social    Drug use: No        PHYSICAL EXAM:   /69 (BP Location: Left arm)   Pulse 91   Temp 98.8 °F (37.1 °C) (Oral)   Resp 18   Ht 6' 4\" (1.93 m)   Wt 239 lb (108.4 kg)   SpO2 94%   BMI 29.09 kg/m²   He is alert, oriented, and appropriate examination in no apparent distress.  He is vital signs are as enumerated above.  His dressings were changed.  There is no active drainage present.  There is no induration, fluctuance, no cellulitis present.  Homans' sign is negative.  His distal neurovascular status is unchanged and intact.  There is good color and capillary refill noted.    Laboratory Data:  Lab Results   Component Value Date    WBC 8.3 03/18/2024    HGB 12.7 (L) 03/22/2024    HCT 37.2 (L) 03/22/2024    .0 03/18/2024    CREATSERUM 1.29 03/18/2024    BUN 17 03/18/2024     03/18/2024    K 4.1 03/18/2024     03/18/2024    CO2 24.0 03/18/2024    GLU 87 03/18/2024    CA 9.5 03/18/2024    ALB 3.5 08/06/2020    ALKPHO 68 08/06/2020    TP 7.1 08/06/2020     AST 34 08/06/2020    ALT 47 08/06/2020    INR 0.87 12/16/2023    PTP 12.3 12/16/2023    TSH 3.597 04/20/2006     08/06/2020    PSA 0.4 12/05/2007         IMAGING:   XR KNEE (1 OR 2 VIEWS), RIGHT (CPT=73560)    Result Date: 3/21/2024  PROCEDURE: XR KNEE (1 OR 2 VIEWS), RIGHT (CPT=73560)  COMPARISON: None.  INDICATIONS: Postoperative right knee arthroplasty.   TECHNIQUE: Two-view Findings and impression:  Status post TKA with drain into the suprapatellar space terminating at the anterior aspect of the joint line.  Normal alignment with no displaced fracture    Dictated by (CST): Garrett Fernandes MD on 3/21/2024 at 1:32 PM     Finalized by (CST): Garrett Fernandes MD on 3/21/2024 at 1:33 PM                ASSESSMENT AND PLAN:     Primary osteoarthritis of right knee      Dyslipidemia      Hypothyroidism      ____________________________________________________  Incision is looking good.  Patient did well with physical therapy.  Pain well-controlled.  Hemodynamically stable.  Okay to be discharged today if cleared by hospitalist.  Dr. Lozada will analgesic and DVT prophylaxis prescriptions.  Follow-up instructions per Dr. Lozada.  Numerous questions were asked and all of which were answered to the best my abilities.        Digital transcription software was utilized to produce this note. The note was proofread for content only. Typographical errors may remain.      DEACON VIDAL MD  3/23/2024

## 2024-03-23 NOTE — CM/SW NOTE
03/23/24 0700   Discharge disposition   Expected discharge disposition Home-Health   Post Acute Care Provider   ([facility])   Discharge transportation Private car     SW informed  provider of pt discharge and requested follow up with pt/family for SOC in the community.     CORY Mckeon, LSW  Social Work   Ext:#[phone]

## 2024-03-23 NOTE — DISCHARGE SUMMARY
[facility]  part of [facility]     Discharge Summary    Jorge Luis Bautista Patient Status:  Outpatient in a Bed    10/2/1955 MRN [id number]   Location [facility] Attending Scooter Trejo MD   Hosp Day # 0 PCP TITI VARGAS MD     Date of Admission: 3/21/2024    Date of Discharge: 2024    Admitting Diagnosis: Osteoarthritis    Discharge Diagnosis:   Patient Active Problem List   Diagnosis    TIA (transient ischemic attack)    Spinal stenosis    Osteoarthritis of left knee    Dyslipidemia    SOB (shortness of breath)    Nonrheumatic aortic valve stenosis    Primary osteoarthritis of right knee    Hypothyroidism    Preop testing       Reason for Admission:     Primary osteoarthritis of R knee.    Physical Exam: /69 (BP Location: Left arm)   Pulse 91   Temp 98.8 °F (37.1 °C) (Oral)   Resp 18   Ht 6' 4\" (1.93 m)   Wt 239 lb (108.4 kg)   SpO2 94%   BMI 29.09 kg/m²     General Appearance:    Alert, cooperative, no distress, appears stated age   Head:    Normocephalic, without obvious abnormality, atraumatic   Eyes:    PERRL, conjunctiva/corneas clear, EOM's intact, fundi     benign, both eyes                Throat:   Lips, mucosa, and tongue normal; teeth and gums normal   Neck:   Supple, symmetrical, trachea midline, no adenopathy;        thyroid:  No enlargement/tenderness/nodules; no carotid    bruit or JVD   Back:     Symmetric, no curvature, ROM normal, no CVA tenderness   Lungs:     Clear to auscultation bilaterally, respirations unlabored   Chest wall:    No tenderness or deformity   Heart:    Regular rate and rhythm, S1 and S2 normal, no murmur, rub   or gallop   Abdomen:     Soft, non-tender, bowel sounds active all four quadrants,     no masses, no organomegaly           Extremities:   Extremities normal, atraumatic, no cyanosis or edema   Pulses:   2+ and symmetric all extremities   Skin:   Skin color, texture, turgor normal, no rashes or lesions        Neurologic:   CNII-XII intact. Normal strength, sensation and reflexes       throughout       Hospital Course:     1.       Right knee primary osteoarthritis, status post right total knee arthroplasty.  Spinal block.  Pain control.  Neurovascular checks.  Xarelto for DVT prophylaxis.  Monitor surgical wound and drain.  Physical and occupational therapy.  2.       Hyperlipidemia.  Continue home medications.  3.       Hypothyroidism.  Continue home medications.      FOR DVT PPX - PATIENT PRESCRIBED XARELTO X3 WEEKS, HOLD ASA 81 UNTIL THEN, F/U WITH PCP.     History of Present Illness:     Per admitting attending:   Jorge Luis Bautista is a(n) 68 year old male. Pt with h/o right knee severe DJD.  Pt. Failed conservative management.  He complains of debilitating pain. He elects right total knee arthroplasty.  Surgery risks including but not limited to fx/ nerve injury/ blood loss/ blood clots/ infection/ contracture/ failure of prosthesis and continued pain were discussed.  Pt. Understands postop course.  He wishes to proceed.     Disposition: Home or Self Care    Discharge Condition: Good    Discharge Medications:   Current Discharge Medication List        START taking these medications    Details   rivaroxaban 10 MG Oral Tab Take 1 tablet (10 mg total) by mouth daily.  Qty: 18 tablet, Refills: 0    Associated Diagnoses: Primary osteoarthritis of right knee      HYDROcodone-acetaminophen  MG Oral Tab Take 1-2 tablets by mouth every 6 (six) hours as needed.  Qty: 40 tablet, Refills: 0    Associated Diagnoses: Primary osteoarthritis of right knee           CONTINUE these medications which have NOT CHANGED    Details   cholecalciferol 50 MCG (2000 UT) Oral Tab Take 1 tablet (2,000 Units total) by mouth daily.      cromolyn 5.2 MG/ACT Nasal Aero Soln 1 spray by Nasal route in the morning and 1 spray before bedtime.      METRONIDAZOLE 1 % External Gel Apply to face once to twice daily.  Qty: 60 g, Refills: 0       levothyroxine 50 MCG Oral Tab Take 1 tablet (50 mcg total) by mouth before breakfast.      Omega-3 Fatty Acids (FISH OIL) 1200 MG Oral Cap Take 2 tablets by mouth daily.      Atorvastatin Calcium 20 MG Oral Tab Take 1 tablet (20 mg total) by mouth nightly.      cetirizine 10 MG Oral Tab Take 1 tablet (10 mg total) by mouth daily.      DiphenhydrAMINE HCl, Sleep, (SLEEP AID OR) Take 1 tablet by mouth nightly as needed (for sleep). 50 mg      OCUVITE-LUTEIN OR CAPS Take 1 tablet by mouth daily.      CENTRUM SILVER OR Take 1 tablet by mouth daily.           STOP taking these medications       Aspirin 81 MG Oral Cap              Total dc time > 30 min    Scooter Trejo MD  3/23/2024  9:02 AM     Hospital Discharge Diagnoses:  Primary osteoarthritis of r knee.     Lace+ Score: 36  59-90 High Risk  29-58 Medium Risk  0-28   Low Risk.    TCM Follow-Up Recommendation:  LACE 29-58: Moderate Risk of readmission after discharge from the hospital.

## 2024-03-23 NOTE — PHYSICAL THERAPY NOTE
PHYSICAL THERAPY TREATMENT NOTE - INPATIENT     Room Number: Room 4/Room 4-A       Presenting Problem: R TKR  Co-Morbidities : Aortic valve replacement, hypothyroism, hand tendon release procedures with B hand deformity, TIA, SOB    Problem List  Principal Problem:    Primary osteoarthritis of right knee  Active Problems:    Dyslipidemia    Hypothyroidism    Preop testing      PHYSICAL THERAPY ASSESSMENT   Patient demonstrates good  progress this session, goals  progressing with ambulation and stair training this session.     Patient continues to function below baseline with bed mobility, transfers, gait, and stair negotiation.  Contributing factors to remaining limitations include decreased functional strength, pain, impaired dynamic balance, and limited R knee ROM.  Pt demos adequate safety and stability with functional mobility and is safe to discharge from PT standpoint. Physical Therapy will continue to follow patient for duration of hospitalization.    Patient continues to benefit from continued skilled PT services: at discharge to promote functional independence and safety with additional support and return home with home health PT.    PLAN  PT Treatment Plan: Bed mobility;Body mechanics;Endurance;Energy conservation;Patient education;Gait training;Strengthening;Transfer training;Balance training;Stoop training;Stair training  Frequency (Obs): Daily    SUBJECTIVE  Agreeable to therapy     OBJECTIVE  Precautions: Limb alert - right    WEIGHT BEARING RESTRICTION        R Lower Extremity: Weight Bearing as Tolerated       PAIN ASSESSMENT   Ratin  Location: R knee  Management Techniques: Activity promotion;Body mechanics;Breathing techniques    BALANCE  Static Sitting: Good  Dynamic Sitting: Fair +  Static Standing: Fair  Dynamic Standing: Fair -        AM-PAC '6-Clicks' INPATIENT SHORT FORM - BASIC MOBILITY  How much difficulty does the patient currently have...  Patient Difficulty: Turning over in bed  (including adjusting bedclothes, sheets and blankets)?: A Little   Patient Difficulty: Sitting down on and standing up from a chair with arms (e.g., wheelchair, bedside commode, etc.): A Little   Patient Difficulty: Moving from lying on back to sitting on the side of the bed?: A Little   How much help from another person does the patient currently need...   Help from Another: Moving to and from a bed to a chair (including a wheelchair)?: A Little   Help from Another: Need to walk in hospital room?: A Little   Help from Another: Climbing 3-5 steps with a railing?: A Little     AM-PAC Score:  Raw Score: 18   Approx Degree of Impairment: 46.58%   Standardized Score (AM-PAC Scale): 43.63   CMS Modifier (G-Code): CK    FUNCTIONAL ABILITY STATUS  Functional Mobility/Gait Assessment  Gait Assistance: Supervision  Distance (ft): 150 ft  Assistive Device: Rolling walker  Pattern: R Decreased stance time (slow pace, decreased step length)  Stairs: Stairs  How Many Stairs: 4  Device: 2 Rails  Assist: Supervision  Pattern: Ascend and Descend  Ascend and Descend : Step to  Rolling:  NT  Supine to Sit:  NT  Sit to Supine:  NT  Sit to Stand: supervision with RW    Additional information: Pt agreeable to therapy, identified by name and , gait belt donned for mobility.Pt ed provided on importance of OOB mobility once home, use of ice, use of RW, HEP therex, and car transfers, all with good pt understanding. Pt requested to practice stairs again prior to discharge. All pt questions and concerns addressed.     The patient's Approx Degree of Impairment: 46.58% has been calculated based on documentation in the [facility] '6 clicks' Inpatient Daily Activity Short Form.  Research supports that patients with this level of impairment may benefit from HHPT.  Final disposition will be made by interdisciplinary medical team.    THERAPEUTIC EXERCISES  Lower Extremity Ankle pumps  Heel slides  LAQ  Quad sets     Position Sitting       Patient End  of Session: Up in chair;Call light within reach;Needs met;RN aware of session/findings;Ice applied    CURRENT GOALS   Goals to be met by: 3/30/2024  Patient Goal Patient's self-stated goal is: Return home    Goal #1 Patient is able to demonstrate supine - sit EOB @ level: modified independent      Goal #1   Current Status  SBA    Goal #2 Patient is able to demonstrate transfers Sit to/from Stand at assistance level: modified independent      Goal #2  Current Status  Supervision with RW   Goal #3 Patient is able to ambulate 300 feet with assistive device at assistance level: modified independent    Goal #3   Current Status  Supervision with '   Goal #4 Patient will negotiate 14 stairs/one curb w/ assistive device and supervision   Goal #4   Current Status Supervision, 4 with 2 rails   Goal #5  AROM 0 degrees extension to 95 degrees flexion     Goal #5   Current Status  0-55 R knee (guarded needs work)   Goal #6 Patient independently performs home exercise program for ROM/strengthening per the instructions provided in preparation for discharge.   Goal #6  Current Status  ongoing     Gait Trainin minutes  Therapeutic Activity: 15 minutes

## 2024-12-05 ENCOUNTER — LAB REQUISITION (OUTPATIENT)
Dept: LAB | Age: 69
End: 2024-12-05

## 2024-12-05 ENCOUNTER — LAB SERVICES (OUTPATIENT)
Dept: LAB | Age: 69
End: 2024-12-05

## 2024-12-05 DIAGNOSIS — E78.5 HYPERLIPIDEMIA, UNSPECIFIED: ICD-10-CM

## 2024-12-05 DIAGNOSIS — Z12.5 ENCOUNTER FOR SCREENING FOR MALIGNANT NEOPLASM OF PROSTATE: ICD-10-CM

## 2024-12-05 DIAGNOSIS — E03.9 HYPOTHYROIDISM, UNSPECIFIED: ICD-10-CM

## 2024-12-05 LAB
ALBUMIN SERPL-MCNC: 3.7 G/DL (ref 3.4–5)
ALBUMIN/GLOB SERPL: 1.2 {RATIO} (ref 1–2.4)
ALP SERPL-CCNC: 78 UNITS/L (ref 45–117)
ALT SERPL-CCNC: 32 UNITS/L
ANION GAP SERPL CALC-SCNC: 13 MMOL/L (ref 7–19)
AST SERPL-CCNC: 31 UNITS/L
BILIRUB SERPL-MCNC: 1.3 MG/DL (ref 0.2–1)
BUN SERPL-MCNC: 20 MG/DL (ref 6–20)
BUN/CREAT SERPL: 17 (ref 7–25)
CALCIUM SERPL-MCNC: 9.1 MG/DL (ref 8.4–10.2)
CHLORIDE SERPL-SCNC: 109 MMOL/L (ref 97–110)
CHOLEST SERPL-MCNC: 152 MG/DL
CHOLEST/HDLC SERPL: 3.6 {RATIO}
CO2 SERPL-SCNC: 23 MMOL/L (ref 21–32)
CREAT SERPL-MCNC: 1.16 MG/DL (ref 0.67–1.17)
EGFRCR SERPLBLD CKD-EPI 2021: 67 ML/MIN/{1.73_M2}
FASTING DURATION TIME PATIENT: 12 HOURS (ref 0–999)
GLOBULIN SER-MCNC: 3 G/DL (ref 2–4)
GLUCOSE SERPL-MCNC: 94 MG/DL (ref 70–99)
HDLC SERPL-MCNC: 42 MG/DL
LDLC SERPL CALC-MCNC: 88 MG/DL
NONHDLC SERPL-MCNC: 110 MG/DL
POTASSIUM SERPL-SCNC: 4 MMOL/L (ref 3.4–5.1)
PROT SERPL-MCNC: 6.7 G/DL (ref 6.4–8.2)
PSA SERPL-MCNC: 0.78 NG/ML
SODIUM SERPL-SCNC: 141 MMOL/L (ref 135–145)
TRIGL SERPL-MCNC: 109 MG/DL
TSH SERPL-ACNC: 2.71 MCUNITS/ML (ref 0.35–5)

## 2024-12-05 PROCEDURE — 80061 LIPID PANEL: CPT | Performed by: CLINICAL MEDICAL LABORATORY

## 2024-12-05 PROCEDURE — 80053 COMPREHEN METABOLIC PANEL: CPT | Performed by: CLINICAL MEDICAL LABORATORY

## 2024-12-05 PROCEDURE — G0103 PSA SCREENING: HCPCS | Performed by: CLINICAL MEDICAL LABORATORY

## 2024-12-05 PROCEDURE — 84443 ASSAY THYROID STIM HORMONE: CPT | Performed by: CLINICAL MEDICAL LABORATORY

## 2025-06-25 ENCOUNTER — HOSPITAL ENCOUNTER (OUTPATIENT)
Dept: MRI IMAGING | Facility: HOSPITAL | Age: 70
Discharge: HOME | End: 2025-06-25
Attending: INTERNAL MEDICINE
Payer: MEDICARE

## 2025-06-25 DIAGNOSIS — I42.8 OTHER CARDIOMYOPATHIES (HCC): ICD-10-CM

## 2025-06-25 PROCEDURE — A9575 INJ GADOTERATE MEGLUMI 0.1ML: HCPCS | Performed by: INTERNAL MEDICINE

## 2025-06-25 PROCEDURE — 75561 CARDIAC MRI FOR MORPH W/DYE: CPT | Performed by: INTERNAL MEDICINE

## 2025-06-25 RX ORDER — GADOTERATE MEGLUMINE 376.9 MG/ML
20 INJECTION INTRAVENOUS
Status: COMPLETED | OUTPATIENT
Start: 2025-06-25 | End: 2025-06-25

## 2025-06-25 RX ADMIN — GADOTERATE MEGLUMINE 20 ML: 376.9 INJECTION INTRAVENOUS at 08:25:00

## 2025-06-30 ENCOUNTER — LAB REQUISITION (OUTPATIENT)
Dept: LAB | Age: 70
End: 2025-06-30

## 2025-07-01 ENCOUNTER — LAB SERVICES (OUTPATIENT)
Dept: LAB | Age: 70
End: 2025-07-01

## 2025-08-01 ENCOUNTER — HOSPITAL ENCOUNTER (OUTPATIENT)
Dept: GENERAL RADIOLOGY | Age: 70
Discharge: HOME | End: 2025-08-01
Attending: FAMILY MEDICINE

## 2025-08-01 DIAGNOSIS — R05.1 ACUTE COUGH: ICD-10-CM

## 2025-08-01 PROCEDURE — 71046 X-RAY EXAM CHEST 2 VIEWS: CPT | Performed by: FAMILY MEDICINE

## (undated) DEVICE — Device: Brand: DEFENDO AIR/WATER/SUCTION AND BIOPSY VALVE

## (undated) DEVICE — SUT COAT VCRL 0 27IN CP-1 ABSRB UD 36MM 1/2

## (undated) DEVICE — SUT ETHBND XL 2 30IN V-37 NABSRB GRN 40MM 1/2

## (undated) DEVICE — 6 ML SYRINGE LUER-LOCK TIP: Brand: MONOJECT

## (undated) DEVICE — DISPOSABLE TOURNIQUET CUFF SINGLE BLADDER, DUAL PORT AND QUICK CONNECT CONNECTOR: Brand: COLOR CUFF

## (undated) DEVICE — FORCEP RADIAL JAW 4

## (undated) DEVICE — WRAP COOLING KNEE W/ICE PILLOW

## (undated) DEVICE — PROXIMATE SKIN STAPLERS (35 WIDE) CONTAINS 35 STAINLESS STEEL STAPLES (FIXED HEAD): Brand: PROXIMATE

## (undated) DEVICE — BANDAGE COHESIVE W4INXL5YD TAN E POR SLF ADH

## (undated) DEVICE — ENCORE® LATEX MICRO SIZE 8, STERILE LATEX POWDER-FREE SURGICAL GLOVE: Brand: ENCORE

## (undated) DEVICE — SUT VCRL 2-0 27IN FS-1 ABSRB UD L24MM 3/8 CIR

## (undated) DEVICE — SCREW ORTH 2.5X25MM FEM HEX PERSONA

## (undated) DEVICE — SHEET,DRAPE,53X77,STERILE: Brand: MEDLINE

## (undated) DEVICE — PIN DRL 75MM HDLSS TRCR NXGN

## (undated) DEVICE — SOLUTION IRRIG 3000ML 0.9% NACL FLX CONT

## (undated) DEVICE — Device: Brand: CUSTOM PROCEDURE KIT

## (undated) DEVICE — PACK CDS TOTAL KNEE

## (undated) DEVICE — SOLUTION IRRIG 1000ML 0.9% NACL USP BTL

## (undated) DEVICE — LINE MNTR ADLT SET O2 INTMD

## (undated) DEVICE — CONMED SCOPE SAVER BITE BLOCK, 20X27 MM: Brand: SCOPE SAVER

## (undated) DEVICE — Device: Brand: STABLECUT®

## (undated) DEVICE — ENCORE® LATEX MICRO SIZE 7.5, STERILE LATEX POWDER-FREE SURGICAL GLOVE: Brand: ENCORE

## (undated) DEVICE — 3 ML SYRINGE LUER-LOCK TIP: Brand: MONOJECT

## (undated) DEVICE — MEDI-VAC NON-CONDUCTIVE SUCTION TUBING 6MM X 1.8M (6FT.) L: Brand: CARDINAL HEALTH

## (undated) DEVICE — 35 ML SYRINGE REGULAR TIP: Brand: MONOJECT

## (undated) DEVICE — SKIN PREP TRAY 4 COMPARTM TRAY: Brand: MEDLINE INDUSTRIES, INC.

## (undated) DEVICE — BLADE RMR 51MM PAT W/ PILOT H

## (undated) DEVICE — CEMENT MIXING SYSTEM WITH FEMORAL BREAKWAY NOZZLE: Brand: REVOLUTION

## (undated) DEVICE — SCREW FIX 3.5X48MM HEX HD

## (undated) DEVICE — KIT EVAC 400CC DIA1/8IN H PAT 12.5IN 3 SPR

## (undated) NOTE — LETTER
Hospital Discharge Documentation  Please phone to schedule a hospital follow up appointment.    From: [facility] Hospitalist's Office  Phone: [phone]    Patient discharged time/date: 3/23/2024 12:57 PM  Patient discharge disposition:  Home Health Care Services       Discharge Summary - D/C Summary        Discharge Summary signed by Scooter Trejo MD at 3/23/2024  9:04 AM  Version 1 of 1      Author: Scooter Trejo MD Service: Hospitalist Author Type: Physician    Filed: 3/23/2024  9:04 AM Date of Service: 3/23/2024  9:02 AM Status: Signed    : Scooter Trejo MD (Physician)         [facility]  part of [facility]     Discharge Summary    Jorge Luis Bautista Patient Status:  Outpatient in a Bed    10/2/1955 MRN [id number]   Location [facility] Attending Scooter Trejo MD   Hosp Day # 0 PCP TITI VARGAS MD     Date of Admission: 3/21/2024    Date of Discharge: 2024    Admitting Diagnosis: Osteoarthritis    Discharge Diagnosis:   Patient Active Problem List   Diagnosis    TIA (transient ischemic attack)    Spinal stenosis    Osteoarthritis of left knee    Dyslipidemia    SOB (shortness of breath)    Nonrheumatic aortic valve stenosis    Primary osteoarthritis of right knee    Hypothyroidism    Preop testing       Reason for Admission:     Primary osteoarthritis of R knee.    Physical Exam: /69 (BP Location: Left arm)   Pulse 91   Temp 98.8 °F (37.1 °C) (Oral)   Resp 18   Ht 6' 4\" (1.93 m)   Wt 239 lb (108.4 kg)   SpO2 94%   BMI 29.09 kg/m²     General Appearance:    Alert, cooperative, no distress, appears stated age   Head:    Normocephalic, without obvious abnormality, atraumatic   Eyes:    PERRL, conjunctiva/corneas clear, EOM's intact, fundi     benign, both eyes                Throat:   Lips, mucosa, and tongue normal; teeth and gums normal   Neck:   Supple, symmetrical, trachea midline, no adenopathy;        thyroid:  No  enlargement/tenderness/nodules; no carotid    bruit or JVD   Back:     Symmetric, no curvature, ROM normal, no CVA tenderness   Lungs:     Clear to auscultation bilaterally, respirations unlabored   Chest wall:    No tenderness or deformity   Heart:    Regular rate and rhythm, S1 and S2 normal, no murmur, rub   or gallop   Abdomen:     Soft, non-tender, bowel sounds active all four quadrants,     no masses, no organomegaly           Extremities:   Extremities normal, atraumatic, no cyanosis or edema   Pulses:   2+ and symmetric all extremities   Skin:   Skin color, texture, turgor normal, no rashes or lesions       Neurologic:   CNII-XII intact. Normal strength, sensation and reflexes       throughout       Hospital Course:     1.       Right knee primary osteoarthritis, status post right total knee arthroplasty.  Spinal block.  Pain control.  Neurovascular checks.  Xarelto for DVT prophylaxis.  Monitor surgical wound and drain.  Physical and occupational therapy.  2.       Hyperlipidemia.  Continue home medications.  3.       Hypothyroidism.  Continue home medications.      FOR DVT PPX - PATIENT PRESCRIBED XARELTO X3 WEEKS, HOLD ASA 81 UNTIL THEN, F/U WITH PCP.     History of Present Illness:     Per admitting attending:   Jorge Luis Bautista is a(n) 68 year old male. Pt with h/o right knee severe DJD.  Pt. Failed conservative management.  He complains of debilitating pain. He elects right total knee arthroplasty.  Surgery risks including but not limited to fx/ nerve injury/ blood loss/ blood clots/ infection/ contracture/ failure of prosthesis and continued pain were discussed.  Pt. Understands postop course.  He wishes to proceed.     Disposition: Home or Self Care    Discharge Condition: Good    Discharge Medications:   Current Discharge Medication List        START taking these medications    Details   rivaroxaban 10 MG Oral Tab Take 1 tablet (10 mg total) by mouth daily.  Qty: 18 tablet, Refills: 0     Associated Diagnoses: Primary osteoarthritis of right knee      HYDROcodone-acetaminophen  MG Oral Tab Take 1-2 tablets by mouth every 6 (six) hours as needed.  Qty: 40 tablet, Refills: 0    Associated Diagnoses: Primary osteoarthritis of right knee           CONTINUE these medications which have NOT CHANGED    Details   cholecalciferol 50 MCG (2000 UT) Oral Tab Take 1 tablet (2,000 Units total) by mouth daily.      cromolyn 5.2 MG/ACT Nasal Aero Soln 1 spray by Nasal route in the morning and 1 spray before bedtime.      METRONIDAZOLE 1 % External Gel Apply to face once to twice daily.  Qty: 60 g, Refills: 0      levothyroxine 50 MCG Oral Tab Take 1 tablet (50 mcg total) by mouth before breakfast.      Omega-3 Fatty Acids (FISH OIL) 1200 MG Oral Cap Take 2 tablets by mouth daily.      Atorvastatin Calcium 20 MG Oral Tab Take 1 tablet (20 mg total) by mouth nightly.      cetirizine 10 MG Oral Tab Take 1 tablet (10 mg total) by mouth daily.      DiphenhydrAMINE HCl, Sleep, (SLEEP AID OR) Take 1 tablet by mouth nightly as needed (for sleep). 50 mg      OCUVITE-LUTEIN OR CAPS Take 1 tablet by mouth daily.      CENTRUM SILVER OR Take 1 tablet by mouth daily.           STOP taking these medications       Aspirin 81 MG Oral Cap              Total dc time > 30 min    Scooter Trejo MD  3/23/2024  9:02 AM     Hospital Discharge Diagnoses:  Primary osteoarthritis of r knee.     Lace+ Score: 36  59-90 High Risk  29-58 Medium Risk  0-28   Low Risk.    TCM Follow-Up Recommendation:  LACE 29-58: Moderate Risk of readmission after discharge from the hospital.       Electronically signed by Scooter Trejo MD on 3/23/2024  9:04 AM

## (undated) NOTE — LETTER
[facility]  [address], [address] [place] [address]       05/07/18        Patient: Natty Russo   YOB: 1955   Date of Visit: 5/7/2018       Dear  Dr. Sandie Marrufo MD,      Thank you for referring Natty Russo to my practice.   Pl

## (undated) NOTE — LETTER
Dear Dr. Giuseppe Ramires V    This letter is to inform you that Scott Hamm has been attending Physical Therapy with me. See below for my most recent plan of care.        Patient Name: Scott Hamm, : 10/2/1955, MRN: [id number]   Date:  2018  Re